# Patient Record
Sex: MALE | Race: WHITE | NOT HISPANIC OR LATINO | Employment: UNEMPLOYED | URBAN - NONMETROPOLITAN AREA
[De-identification: names, ages, dates, MRNs, and addresses within clinical notes are randomized per-mention and may not be internally consistent; named-entity substitution may affect disease eponyms.]

---

## 2017-11-12 ENCOUNTER — HOSPITAL ENCOUNTER (INPATIENT)
Facility: HOSPITAL | Age: 52
LOS: 3 days | Discharge: HOME | End: 2017-11-15
Attending: EMERGENCY MEDICINE | Admitting: INTERNAL MEDICINE

## 2017-11-12 ENCOUNTER — APPOINTMENT (OUTPATIENT)
Dept: GENERAL RADIOLOGY | Facility: HOSPITAL | Age: 52
End: 2017-11-12

## 2017-11-12 DIAGNOSIS — J18.9 PNEUMONIA OF BOTH LUNGS DUE TO INFECTIOUS ORGANISM, UNSPECIFIED PART OF LUNG: Primary | ICD-10-CM

## 2017-11-12 DIAGNOSIS — F10.20 ALCOHOLISM (HCC): ICD-10-CM

## 2017-11-12 LAB
ALBUMIN SERPL-MCNC: 4.9 G/DL (ref 3.5–5)
ALBUMIN/GLOB SERPL: 1.4 G/DL (ref 1–2)
ALP SERPL-CCNC: 69 U/L (ref 38–126)
ALT SERPL W P-5'-P-CCNC: 48 U/L (ref 13–69)
AMPHET+METHAMPHET UR QL: NEGATIVE
AMPHETAMINES UR QL: NEGATIVE
ANION GAP SERPL CALCULATED.3IONS-SCNC: 22.6 MMOL/L
ARTERIAL PATENCY WRIST A: POSITIVE
AST SERPL-CCNC: 53 U/L (ref 15–46)
BARBITURATES UR QL SCN: NEGATIVE
BASE EXCESS BLDA CALC-SCNC: -1.5 MMOL/L
BASOPHILS # BLD AUTO: 0.07 10*3/MM3 (ref 0–0.2)
BASOPHILS NFR BLD AUTO: 1.1 % (ref 0–2.5)
BDY SITE: ABNORMAL
BENZODIAZ UR QL SCN: NEGATIVE
BILIRUB SERPL-MCNC: 1.1 MG/DL (ref 0.2–1.3)
BILIRUB UR QL STRIP: NEGATIVE
BUN BLD-MCNC: 10 MG/DL (ref 7–20)
BUN/CREAT SERPL: 10 (ref 6.3–21.9)
BUPRENORPHINE SERPL-MCNC: NEGATIVE NG/ML
CALCIUM SPEC-SCNC: 8.7 MG/DL (ref 8.4–10.2)
CANNABINOIDS SERPL QL: NEGATIVE
CHLORIDE SERPL-SCNC: 93 MMOL/L (ref 98–107)
CLARITY UR: CLEAR
CO2 SERPL-SCNC: 20 MMOL/L (ref 26–30)
COCAINE UR QL: NEGATIVE
COHGB MFR BLD: 2.1 %
COLOR UR: YELLOW
CREAT BLD-MCNC: 1 MG/DL (ref 0.6–1.3)
D-LACTATE SERPL-SCNC: 1.7 MMOL/L (ref 0.5–2)
DEPRECATED RDW RBC AUTO: 41.6 FL (ref 37–54)
EOSINOPHIL # BLD AUTO: 0.21 10*3/MM3 (ref 0–0.7)
EOSINOPHIL NFR BLD AUTO: 3.3 % (ref 0–7)
ERYTHROCYTE [DISTWIDTH] IN BLOOD BY AUTOMATED COUNT: 12.2 % (ref 11.5–14.5)
ETHANOL BLD-MCNC: 266 MG/DL
ETHANOL UR QL: 0.27 %
FLUAV AG NPH QL: NEGATIVE
FLUBV AG NPH QL IA: NEGATIVE
GFR SERPL CREATININE-BSD FRML MDRD: 78 ML/MIN/1.73
GLOBULIN UR ELPH-MCNC: 3.5 GM/DL
GLUCOSE BLD-MCNC: 112 MG/DL (ref 74–98)
GLUCOSE UR STRIP-MCNC: NEGATIVE MG/DL
HCO3 BLDA-SCNC: 23.2 MMOL/L (ref 22–28)
HCT VFR BLD AUTO: 43.1 % (ref 42–52)
HGB BLD-MCNC: 15.3 G/DL (ref 14–18)
HGB BLDA-MCNC: 16.2 G/DL (ref 12–18)
HGB UR QL STRIP.AUTO: NEGATIVE
HOROWITZ INDEX BLD+IHG-RTO: 36 %
IMM GRANULOCYTES # BLD: 0.02 10*3/MM3 (ref 0–0.06)
IMM GRANULOCYTES NFR BLD: 0.3 % (ref 0–0.6)
INR PPP: 4.23 (ref 0.9–1.1)
KETONES UR QL STRIP: NEGATIVE
LEUKOCYTE ESTERASE UR QL STRIP.AUTO: NEGATIVE
LYMPHOCYTES # BLD AUTO: 1.55 10*3/MM3 (ref 0.6–3.4)
LYMPHOCYTES NFR BLD AUTO: 24.3 % (ref 10–50)
MCH RBC QN AUTO: 32.8 PG (ref 27–31)
MCHC RBC AUTO-ENTMCNC: 35.5 G/DL (ref 30–37)
MCV RBC AUTO: 92.3 FL (ref 80–94)
METHADONE UR QL SCN: NEGATIVE
METHGB BLD QL: 0.9 %
MODALITY: ABNORMAL
MONOCYTES # BLD AUTO: 0.59 10*3/MM3 (ref 0–0.9)
MONOCYTES NFR BLD AUTO: 9.3 % (ref 0–12)
NEUTROPHILS # BLD AUTO: 3.93 10*3/MM3 (ref 2–6.9)
NEUTROPHILS NFR BLD AUTO: 61.7 % (ref 37–80)
NITRITE UR QL STRIP: NEGATIVE
NRBC BLD MANUAL-RTO: 0 /100 WBC (ref 0–0)
NT-PROBNP SERPL-MCNC: 123 PG/ML (ref 0–125)
OPIATES UR QL: NEGATIVE
OXYCODONE UR QL SCN: NEGATIVE
OXYHGB MFR BLDV: 91.1 % (ref 94–99)
PCO2 BLDA: 36.9 MM HG (ref 35–45)
PCP UR QL SCN: NEGATIVE
PH BLDA: 7.41 PH UNITS (ref 7.3–7.5)
PH UR STRIP.AUTO: 6 [PH] (ref 5–8)
PLATELET # BLD AUTO: 158 10*3/MM3 (ref 130–400)
PMV BLD AUTO: 9.7 FL (ref 6–12)
PO2 BLDA: 70.4 MM HG (ref 75–100)
POTASSIUM BLD-SCNC: 4.6 MMOL/L (ref 3.5–5.1)
PROPOXYPH UR QL: NEGATIVE
PROT SERPL-MCNC: 8.4 G/DL (ref 6.3–8.2)
PROT UR QL STRIP: NEGATIVE
PROTHROMBIN TIME: 46.4 SECONDS (ref 9.3–12.1)
RBC # BLD AUTO: 4.67 10*6/MM3 (ref 4.7–6.1)
SAO2 % BLDCOA: 93.9 %
SODIUM BLD-SCNC: 131 MMOL/L (ref 137–145)
SP GR UR STRIP: <=1.005 (ref 1–1.03)
TRICYCLICS UR QL SCN: NEGATIVE
TROPONIN I SERPL-MCNC: 0.03 NG/ML (ref 0–0.03)
UROBILINOGEN UR QL STRIP: NORMAL
WBC NRBC COR # BLD: 6.37 10*3/MM3 (ref 4.8–10.8)

## 2017-11-12 PROCEDURE — 25010000002 LEVOFLOXACIN PER 250 MG: Performed by: EMERGENCY MEDICINE

## 2017-11-12 PROCEDURE — 94799 UNLISTED PULMONARY SVC/PX: CPT

## 2017-11-12 PROCEDURE — 36600 WITHDRAWAL OF ARTERIAL BLOOD: CPT

## 2017-11-12 PROCEDURE — 93005 ELECTROCARDIOGRAM TRACING: CPT | Performed by: EMERGENCY MEDICINE

## 2017-11-12 PROCEDURE — 87040 BLOOD CULTURE FOR BACTERIA: CPT | Performed by: EMERGENCY MEDICINE

## 2017-11-12 PROCEDURE — 87804 INFLUENZA ASSAY W/OPTIC: CPT | Performed by: EMERGENCY MEDICINE

## 2017-11-12 PROCEDURE — 83605 ASSAY OF LACTIC ACID: CPT | Performed by: EMERGENCY MEDICINE

## 2017-11-12 PROCEDURE — 82375 ASSAY CARBOXYHB QUANT: CPT

## 2017-11-12 PROCEDURE — 80306 DRUG TEST PRSMV INSTRMNT: CPT | Performed by: EMERGENCY MEDICINE

## 2017-11-12 PROCEDURE — 25010000002 PIPERACILLIN SOD-TAZOBACTAM PER 1 G: Performed by: EMERGENCY MEDICINE

## 2017-11-12 PROCEDURE — 25010000002 METHYLPREDNISOLONE PER 125 MG: Performed by: EMERGENCY MEDICINE

## 2017-11-12 PROCEDURE — 82805 BLOOD GASES W/O2 SATURATION: CPT

## 2017-11-12 PROCEDURE — 84484 ASSAY OF TROPONIN QUANT: CPT | Performed by: EMERGENCY MEDICINE

## 2017-11-12 PROCEDURE — 25010000002 MORPHINE PER 10 MG: Performed by: INTERNAL MEDICINE

## 2017-11-12 PROCEDURE — 94660 CPAP INITIATION&MGMT: CPT

## 2017-11-12 PROCEDURE — 71010 HC CHEST PA OR AP: CPT

## 2017-11-12 PROCEDURE — 83880 ASSAY OF NATRIURETIC PEPTIDE: CPT | Performed by: EMERGENCY MEDICINE

## 2017-11-12 PROCEDURE — 99223 1ST HOSP IP/OBS HIGH 75: CPT | Performed by: INTERNAL MEDICINE

## 2017-11-12 PROCEDURE — 83050 HGB METHEMOGLOBIN QUAN: CPT

## 2017-11-12 PROCEDURE — 81003 URINALYSIS AUTO W/O SCOPE: CPT | Performed by: EMERGENCY MEDICINE

## 2017-11-12 PROCEDURE — 85610 PROTHROMBIN TIME: CPT | Performed by: EMERGENCY MEDICINE

## 2017-11-12 PROCEDURE — 80307 DRUG TEST PRSMV CHEM ANLYZR: CPT | Performed by: EMERGENCY MEDICINE

## 2017-11-12 PROCEDURE — 80053 COMPREHEN METABOLIC PANEL: CPT | Performed by: EMERGENCY MEDICINE

## 2017-11-12 PROCEDURE — 85025 COMPLETE CBC W/AUTO DIFF WBC: CPT | Performed by: EMERGENCY MEDICINE

## 2017-11-12 PROCEDURE — 99284 EMERGENCY DEPT VISIT MOD MDM: CPT

## 2017-11-12 PROCEDURE — 94640 AIRWAY INHALATION TREATMENT: CPT

## 2017-11-12 RX ORDER — ACETAMINOPHEN 325 MG/1
650 TABLET ORAL EVERY 4 HOURS PRN
Status: DISCONTINUED | OUTPATIENT
Start: 2017-11-12 | End: 2017-11-15 | Stop reason: HOSPADM

## 2017-11-12 RX ORDER — IPRATROPIUM BROMIDE AND ALBUTEROL SULFATE 2.5; .5 MG/3ML; MG/3ML
3 SOLUTION RESPIRATORY (INHALATION) EVERY 4 HOURS PRN
Status: DISCONTINUED | OUTPATIENT
Start: 2017-11-12 | End: 2017-11-15 | Stop reason: HOSPADM

## 2017-11-12 RX ORDER — DILTIAZEM HYDROCHLORIDE 180 MG/1
360 CAPSULE, COATED, EXTENDED RELEASE ORAL
Status: DISCONTINUED | OUTPATIENT
Start: 2017-11-13 | End: 2017-11-15 | Stop reason: HOSPADM

## 2017-11-12 RX ORDER — SULFAMETHOXAZOLE AND TRIMETHOPRIM 800; 160 MG/1; MG/1
1 TABLET ORAL DAILY
Status: DISCONTINUED | OUTPATIENT
Start: 2017-11-13 | End: 2017-11-15 | Stop reason: HOSPADM

## 2017-11-12 RX ORDER — LORAZEPAM 2 MG/ML
1 INJECTION INTRAMUSCULAR
Status: DISCONTINUED | OUTPATIENT
Start: 2017-11-12 | End: 2017-11-15 | Stop reason: HOSPADM

## 2017-11-12 RX ORDER — LORAZEPAM 2 MG/ML
2 INJECTION INTRAMUSCULAR
Status: DISCONTINUED | OUTPATIENT
Start: 2017-11-12 | End: 2017-11-15 | Stop reason: HOSPADM

## 2017-11-12 RX ORDER — LORAZEPAM 2 MG/ML
4 INJECTION INTRAMUSCULAR
Status: DISCONTINUED | OUTPATIENT
Start: 2017-11-12 | End: 2017-11-15 | Stop reason: HOSPADM

## 2017-11-12 RX ORDER — EMTRICITABINE AND TENOFOVIR DISOPROXIL FUMARATE 200; 300 MG/1; MG/1
1 TABLET, FILM COATED ORAL DAILY
Status: DISCONTINUED | OUTPATIENT
Start: 2017-11-13 | End: 2017-11-15 | Stop reason: HOSPADM

## 2017-11-12 RX ORDER — METHYLPREDNISOLONE SODIUM SUCCINATE 125 MG/2ML
125 INJECTION, POWDER, LYOPHILIZED, FOR SOLUTION INTRAMUSCULAR; INTRAVENOUS ONCE
Status: COMPLETED | OUTPATIENT
Start: 2017-11-12 | End: 2017-11-12

## 2017-11-12 RX ORDER — SODIUM CHLORIDE 0.9 % (FLUSH) 0.9 %
1-10 SYRINGE (ML) INJECTION AS NEEDED
Status: DISCONTINUED | OUTPATIENT
Start: 2017-11-12 | End: 2017-11-15 | Stop reason: HOSPADM

## 2017-11-12 RX ORDER — PANTOPRAZOLE SODIUM 40 MG/1
40 TABLET, DELAYED RELEASE ORAL DAILY
Status: DISCONTINUED | OUTPATIENT
Start: 2017-11-13 | End: 2017-11-15 | Stop reason: HOSPADM

## 2017-11-12 RX ORDER — BUDESONIDE 0.5 MG/2ML
0.5 INHALANT ORAL
Status: DISCONTINUED | OUTPATIENT
Start: 2017-11-12 | End: 2017-11-15 | Stop reason: HOSPADM

## 2017-11-12 RX ORDER — LORAZEPAM 2 MG/1
4 TABLET ORAL
Status: DISCONTINUED | OUTPATIENT
Start: 2017-11-12 | End: 2017-11-15 | Stop reason: HOSPADM

## 2017-11-12 RX ORDER — METHYLPREDNISOLONE SODIUM SUCCINATE 125 MG/2ML
80 INJECTION, POWDER, LYOPHILIZED, FOR SOLUTION INTRAMUSCULAR; INTRAVENOUS EVERY 8 HOURS
Status: DISCONTINUED | OUTPATIENT
Start: 2017-11-13 | End: 2017-11-14

## 2017-11-12 RX ORDER — HYDROCODONE BITARTRATE AND ACETAMINOPHEN 7.5; 325 MG/1; MG/1
1 TABLET ORAL EVERY 4 HOURS PRN
Status: DISCONTINUED | OUTPATIENT
Start: 2017-11-12 | End: 2017-11-15 | Stop reason: HOSPADM

## 2017-11-12 RX ORDER — IPRATROPIUM BROMIDE AND ALBUTEROL SULFATE 2.5; .5 MG/3ML; MG/3ML
3 SOLUTION RESPIRATORY (INHALATION) ONCE
Status: COMPLETED | OUTPATIENT
Start: 2017-11-12 | End: 2017-11-12

## 2017-11-12 RX ORDER — CARVEDILOL 25 MG/1
25 TABLET ORAL 2 TIMES DAILY WITH MEALS
Status: DISCONTINUED | OUTPATIENT
Start: 2017-11-12 | End: 2017-11-15 | Stop reason: HOSPADM

## 2017-11-12 RX ORDER — LORAZEPAM 0.5 MG/1
2 TABLET ORAL
Status: DISCONTINUED | OUTPATIENT
Start: 2017-11-12 | End: 2017-11-15 | Stop reason: HOSPADM

## 2017-11-12 RX ORDER — LORAZEPAM 0.5 MG/1
1 TABLET ORAL
Status: DISCONTINUED | OUTPATIENT
Start: 2017-11-12 | End: 2017-11-15 | Stop reason: HOSPADM

## 2017-11-12 RX ORDER — MORPHINE SULFATE 2 MG/ML
INJECTION, SOLUTION INTRAMUSCULAR; INTRAVENOUS
Status: DISPENSED
Start: 2017-11-12 | End: 2017-11-13

## 2017-11-12 RX ORDER — MORPHINE SULFATE 2 MG/ML
1 INJECTION, SOLUTION INTRAMUSCULAR; INTRAVENOUS ONCE
Status: COMPLETED | OUTPATIENT
Start: 2017-11-12 | End: 2017-11-12

## 2017-11-12 RX ORDER — MORPHINE SULFATE 2 MG/ML
1 INJECTION, SOLUTION INTRAMUSCULAR; INTRAVENOUS
Status: DISCONTINUED | OUTPATIENT
Start: 2017-11-12 | End: 2017-11-15 | Stop reason: HOSPADM

## 2017-11-12 RX ORDER — LEVOFLOXACIN 5 MG/ML
750 INJECTION, SOLUTION INTRAVENOUS ONCE
Status: COMPLETED | OUTPATIENT
Start: 2017-11-12 | End: 2017-11-12

## 2017-11-12 RX ORDER — SODIUM CHLORIDE 9 MG/ML
100 INJECTION, SOLUTION INTRAVENOUS CONTINUOUS
Status: DISCONTINUED | OUTPATIENT
Start: 2017-11-12 | End: 2017-11-15 | Stop reason: HOSPADM

## 2017-11-12 RX ORDER — SODIUM CHLORIDE 0.9 % (FLUSH) 0.9 %
10 SYRINGE (ML) INJECTION AS NEEDED
Status: DISCONTINUED | OUTPATIENT
Start: 2017-11-12 | End: 2017-11-15 | Stop reason: HOSPADM

## 2017-11-12 RX ORDER — EMTRICITABINE AND TENOFOVIR DISOPROXIL FUMARATE 200; 300 MG/1; MG/1
1 TABLET, FILM COATED ORAL
Status: DISCONTINUED | OUTPATIENT
Start: 2017-11-13 | End: 2017-11-12

## 2017-11-12 RX ADMIN — BUDESONIDE 0.5 MG: 0.5 INHALANT RESPIRATORY (INHALATION) at 22:52

## 2017-11-12 RX ADMIN — TAZOBACTAM SODIUM AND PIPERACILLIN SODIUM 4.5 G: 500; 4 INJECTION, SOLUTION INTRAVENOUS at 19:31

## 2017-11-12 RX ADMIN — LEVOFLOXACIN 750 MG: 5 INJECTION, SOLUTION INTRAVENOUS at 21:29

## 2017-11-12 RX ADMIN — MORPHINE SULFATE 1 MG: 2 INJECTION, SOLUTION INTRAMUSCULAR; INTRAVENOUS at 20:52

## 2017-11-12 RX ADMIN — RACEPINEPHRINE HYDROCHLORIDE 0.5 ML: 11.25 SOLUTION RESPIRATORY (INHALATION) at 18:22

## 2017-11-12 RX ADMIN — IPRATROPIUM BROMIDE AND ALBUTEROL SULFATE 3 ML: .5; 3 SOLUTION RESPIRATORY (INHALATION) at 22:52

## 2017-11-12 RX ADMIN — SODIUM CHLORIDE 100 ML/HR: 9 INJECTION, SOLUTION INTRAVENOUS at 21:36

## 2017-11-12 RX ADMIN — METHYLPREDNISOLONE SODIUM SUCCINATE 125 MG: 125 INJECTION, POWDER, FOR SOLUTION INTRAMUSCULAR; INTRAVENOUS at 18:34

## 2017-11-12 RX ADMIN — CARVEDILOL 25 MG: 25 TABLET, FILM COATED ORAL at 21:38

## 2017-11-12 RX ADMIN — IPRATROPIUM BROMIDE AND ALBUTEROL SULFATE 3 ML: .5; 3 SOLUTION RESPIRATORY (INHALATION) at 18:23

## 2017-11-12 NOTE — ED PROVIDER NOTES
Subjective   Patient is a 52 y.o. male presenting with shortness of breath.   History provided by:  Patient   used: No    Shortness of Breath   Severity:  Moderate  Onset quality:  Sudden  Timing:  Constant  Progression:  Unchanged  Chronicity:  New  Context: not URI and not weather changes    Relieved by:  Nothing  Worsened by:  Nothing  Ineffective treatments:  None tried  Associated symptoms: wheezing    Associated symptoms: no abdominal pain, no chest pain, no cough, no diaphoresis, no fever, no headaches, no neck pain, no rash, no sore throat, no sputum production, no syncope and no vomiting        Review of Systems   Constitutional: Negative for chills, diaphoresis and fever.   HENT: Negative for congestion, rhinorrhea, sore throat and trouble swallowing.    Eyes: Negative for discharge and visual disturbance.   Respiratory: Positive for shortness of breath and wheezing. Negative for cough, sputum production and chest tightness.    Cardiovascular: Negative for chest pain, palpitations, leg swelling and syncope.   Gastrointestinal: Negative for abdominal pain, constipation, diarrhea, nausea and vomiting.   Genitourinary: Negative for dysuria, flank pain and hematuria.   Musculoskeletal: Negative for back pain, myalgias and neck pain.   Skin: Negative for color change and rash.   Neurological: Negative for dizziness, weakness, numbness and headaches.   Psychiatric/Behavioral: Negative for self-injury and suicidal ideas.       Past Medical History:   Diagnosis Date   • Alcohol abuse    • Alcoholism    • Anxiety    • Cancer    • Chronic pain disorder    • HIV disease    • Liver disease    • Withdrawal symptoms, alcohol        Allergies   Allergen Reactions   • Hydralazine Other (See Comments) and Cough     SORE THROAT       History reviewed. No pertinent surgical history.    Family History   Problem Relation Age of Onset   • Dementia Mother    • Alcohol abuse Father    • Dementia Father    •  Alcohol abuse Maternal Grandfather    • Alcohol abuse Maternal Grandmother        Social History     Social History   • Marital status: Single     Spouse name: N/A   • Number of children: N/A   • Years of education: N/A     Social History Main Topics   • Smoking status: Never Smoker   • Smokeless tobacco: None   • Alcohol use 7.2 oz/week     12 Cans of beer per week      Comment: 16 OZ BEERS PER DAY/ 12 PACK   • Drug use: No   • Sexual activity: No     Other Topics Concern   • None     Social History Narrative           Objective   Physical Exam   Constitutional: He is oriented to person, place, and time.   Intoxicated   HENT:   Head: Normocephalic and atraumatic.   Nose: Nose normal.   Mouth/Throat: Oropharynx is clear and moist.   Eyes: Conjunctivae and EOM are normal. Pupils are equal, round, and reactive to light.   Neck: Normal range of motion. Neck supple.   Cardiovascular: Normal rate, regular rhythm, normal heart sounds and intact distal pulses.    Pulmonary/Chest: Effort normal. Stridor present. No respiratory distress. He has wheezes. He exhibits no tenderness.   Abdominal: Soft. Bowel sounds are normal. He exhibits no distension. There is no tenderness. There is no rebound and no guarding.   Musculoskeletal: Normal range of motion. He exhibits no edema, tenderness or deformity.   Neurological: He is alert and oriented to person, place, and time. No cranial nerve deficit. Coordination normal.   Skin: Skin is warm and dry. No rash noted. No erythema. No pallor.   Psychiatric: He has a normal mood and affect. His behavior is normal. Judgment and thought content normal.   Nursing note and vitals reviewed.      Procedures         ED Course  ED Course                  MDM  Number of Diagnoses or Management Options  Diagnosis management comments: 52-year-old male presents emergency Department intoxicated with complaints of shortness of breath.  Patient's nephew found the patient is laying on the ground  unresponsive and blue in the face.  Nephew noticed that he wasn't breathing.  He woke him up and has been monitoring the patient over the last 3 hours.  The patient is not doing any better.  He still having wheezing and stridor.  Patient has a history of HIV.  Follows up in [place] with ID. CD4 count 198 per patient. In the emergency department, patient's oxygen saturation on arrival was 88% on room air.  Patient has wheezing in his upper lung fields.  Patient has stridor.  Labs and imaging ordered.  A DuoNeb treatment, Solu-Medrol, racemic epi were ordered.    EKG: Ventricular rate 61, FL interval 218, , castration 86, sinus rhythm.  First-degree AV block.      Final diagnoses:   Pneumonia of both lungs due to infectious organism, unspecified part of lung   Alcoholism            Ryan Gamboa MD  11/14/17 5665

## 2017-11-13 PROBLEM — K70.10 ALCOHOLIC HEPATITIS WITHOUT ASCITES: Status: ACTIVE | Noted: 2017-11-13

## 2017-11-13 PROBLEM — J96.01 ACUTE RESPIRATORY FAILURE WITH HYPOXIA (HCC): Status: ACTIVE | Noted: 2017-11-13

## 2017-11-13 PROBLEM — F10.920 ACUTE ALCOHOLIC INTOXICATION WITHOUT COMPLICATION (HCC): Status: ACTIVE | Noted: 2017-11-13

## 2017-11-13 LAB
INR PPP: 5.37 (ref 0.9–1.1)
PROTHROMBIN TIME: 59.8 SECONDS (ref 9.3–12.1)

## 2017-11-13 PROCEDURE — 85610 PROTHROMBIN TIME: CPT | Performed by: INTERNAL MEDICINE

## 2017-11-13 PROCEDURE — 99233 SBSQ HOSP IP/OBS HIGH 50: CPT | Performed by: INTERNAL MEDICINE

## 2017-11-13 PROCEDURE — 25010000002 METHYLPREDNISOLONE PER 125 MG: Performed by: INTERNAL MEDICINE

## 2017-11-13 PROCEDURE — 25010000002 PIPERACILLIN SOD-TAZOBACTAM PER 1 G: Performed by: INTERNAL MEDICINE

## 2017-11-13 PROCEDURE — 94799 UNLISTED PULMONARY SVC/PX: CPT

## 2017-11-13 PROCEDURE — 94660 CPAP INITIATION&MGMT: CPT

## 2017-11-13 RX ORDER — MULTIPLE VITAMINS W/ MINERALS TAB 9MG-400MCG
1 TAB ORAL DAILY
Status: DISCONTINUED | OUTPATIENT
Start: 2017-11-14 | End: 2017-11-13

## 2017-11-13 RX ORDER — MULTIPLE VITAMINS W/ MINERALS TAB 9MG-400MCG
1 TAB ORAL DAILY
Status: COMPLETED | OUTPATIENT
Start: 2017-11-13 | End: 2017-11-15

## 2017-11-13 RX ORDER — FOLIC ACID 1 MG/1
1 TABLET ORAL DAILY
Status: DISCONTINUED | OUTPATIENT
Start: 2017-11-14 | End: 2017-11-13

## 2017-11-13 RX ORDER — THIAMINE MONONITRATE (VIT B1) 100 MG
100 TABLET ORAL DAILY
Status: DISCONTINUED | OUTPATIENT
Start: 2017-11-14 | End: 2017-11-13

## 2017-11-13 RX ORDER — GABAPENTIN 600 MG/1
600 TABLET ORAL 3 TIMES DAILY
Status: ON HOLD | COMMUNITY
End: 2020-11-17

## 2017-11-13 RX ORDER — FOLIC ACID 1 MG/1
1 TABLET ORAL DAILY
Status: COMPLETED | OUTPATIENT
Start: 2017-11-13 | End: 2017-11-15

## 2017-11-13 RX ORDER — LEVOFLOXACIN 5 MG/ML
500 INJECTION, SOLUTION INTRAVENOUS EVERY 24 HOURS
Status: DISCONTINUED | OUTPATIENT
Start: 2017-11-13 | End: 2017-11-15 | Stop reason: HOSPADM

## 2017-11-13 RX ORDER — THIAMINE MONONITRATE (VIT B1) 100 MG
100 TABLET ORAL DAILY
Status: DISCONTINUED | OUTPATIENT
Start: 2017-11-13 | End: 2017-11-14 | Stop reason: SDUPTHER

## 2017-11-13 RX ADMIN — DILTIAZEM HYDROCHLORIDE 360 MG: 180 CAPSULE, COATED, EXTENDED RELEASE ORAL at 09:39

## 2017-11-13 RX ADMIN — METHYLPREDNISOLONE SODIUM SUCCINATE 80 MG: 125 INJECTION, POWDER, FOR SOLUTION INTRAMUSCULAR; INTRAVENOUS at 11:49

## 2017-11-13 RX ADMIN — TAZOBACTAM SODIUM AND PIPERACILLIN SODIUM 4.5 G: 500; 4 INJECTION, SOLUTION INTRAVENOUS at 20:14

## 2017-11-13 RX ADMIN — FOLIC ACID 1 MG: 1 TABLET ORAL at 12:45

## 2017-11-13 RX ADMIN — CARVEDILOL 25 MG: 25 TABLET, FILM COATED ORAL at 09:38

## 2017-11-13 RX ADMIN — METHYLPREDNISOLONE SODIUM SUCCINATE 80 MG: 125 INJECTION, POWDER, FOR SOLUTION INTRAMUSCULAR; INTRAVENOUS at 03:20

## 2017-11-13 RX ADMIN — TAZOBACTAM SODIUM AND PIPERACILLIN SODIUM 4.5 G: 500; 4 INJECTION, SOLUTION INTRAVENOUS at 04:43

## 2017-11-13 RX ADMIN — CARVEDILOL 25 MG: 25 TABLET, FILM COATED ORAL at 18:16

## 2017-11-13 RX ADMIN — PANTOPRAZOLE SODIUM 40 MG: 40 TABLET, DELAYED RELEASE ORAL at 09:38

## 2017-11-13 RX ADMIN — TAZOBACTAM SODIUM AND PIPERACILLIN SODIUM 4.5 G: 500; 4 INJECTION, SOLUTION INTRAVENOUS at 11:54

## 2017-11-13 RX ADMIN — BUDESONIDE 0.5 MG: 0.5 INHALANT RESPIRATORY (INHALATION) at 19:48

## 2017-11-13 RX ADMIN — Medication 100 MG: at 12:45

## 2017-11-13 RX ADMIN — METHYLPREDNISOLONE SODIUM SUCCINATE 80 MG: 125 INJECTION, POWDER, FOR SOLUTION INTRAMUSCULAR; INTRAVENOUS at 18:16

## 2017-11-13 RX ADMIN — IPRATROPIUM BROMIDE AND ALBUTEROL SULFATE 3 ML: .5; 3 SOLUTION RESPIRATORY (INHALATION) at 06:51

## 2017-11-13 RX ADMIN — SULFAMETHOXAZOLE AND TRIMETHOPRIM 160 MG: 800; 160 TABLET ORAL at 09:39

## 2017-11-13 RX ADMIN — BUDESONIDE 0.5 MG: 0.5 INHALANT RESPIRATORY (INHALATION) at 06:51

## 2017-11-13 RX ADMIN — EMTRICITABINE AND TENOFOVIR DISOPROXIL FUMARATE 1 TABLET: 200; 300 TABLET, FILM COATED ORAL at 09:38

## 2017-11-13 RX ADMIN — MULTIPLE VITAMINS W/ MINERALS TAB 1 TABLET: TAB at 12:45

## 2017-11-13 RX ADMIN — IPRATROPIUM BROMIDE AND ALBUTEROL SULFATE 3 ML: .5; 3 SOLUTION RESPIRATORY (INHALATION) at 19:48

## 2017-11-13 NOTE — PROGRESS NOTES
"Hospitalist Progress Note.    LOS: 1 day    Patient Care Team:  Rosa Tucker MD as PCP - General    Chief Complaint:    Chief Complaint   Patient presents with   • Shortness of Breath       Subjective   Mr. Parker is a 52-year-old male with medical history of HIV/AIDS on HAART therapy with reported compliance per patient, last CD4 count 198, admitted to the ICU with severe COPD exacerbation, placed on BiPAP and improving significantly.     Patient seen and examined this morning.  He appears extremely anxious rocking back and forth in his chair.  States that he is feeling much better.  Denies any coughing, fevers, or chills.  He is requesting that we move him out of the ICU as the ICU makes him extremely anxious.  In terms of his alcohol use, he does admit to drinking significant amount of alcohol and states that he is aware that he needs to cut down on his alcohol intake.  He however is not interested in going to rehabilitation at this time.  Denies ever going into alcohol withdrawal. Patient denies any history of smoking.  Does have nebulizers at home which she has been out of for the past 2 weeks.    No nausea or vomiting no abdominal pain.  There is no significant edema.   Patient also denies having new onset weakness of numbness of either extremity.    Review of Systems:    The pertinent  ROS was done and it is noted above, rest  was negative.    Objective     Vital Signs  /98  Pulse 72  Temp 97.6 °F (36.4 °C) (Axillary)   Resp 19  Ht 70\" (177.8 cm)  Wt 241 lb 14.4 oz (110 kg)  SpO2 96%  BMI 34.71 kg/m2           Intake/Output Summary (Last 24 hours) at 11/13/17 1224  Last data filed at 11/13/17 0700   Gross per 24 hour   Intake             1316 ml   Output             5325 ml   Net            -4009 ml       Physical Exam:    General Appearance: alert, oriented x 3, no acute distress, appears mildly anxious  HEENT: pupils round and reactive to light, oral mucosa dry, extra occular movements " intact.  Neck: supple, no JVD, trachea midline  Lungs: Clear to Auscultation, unlabored breathing effort, mild diffuse expiratory wheezing with scattered rhonchi  Heart: RRR, normal S1 and S2, no S3, no rub  Abdomen: soft, non-tender, no palpable bladder, present bowel sounds to auscultation  Extremities: no edema, cyanosis or clubbing.   Neuro: normal speech and mental status, grossly non focal.     Results Review:      Results from last 7 days  Lab Units 11/12/17  1820   SODIUM mmol/L 131*   POTASSIUM mmol/L 4.6   CHLORIDE mmol/L 93*   CO2 mmol/L 20.0*   BUN mg/dL 10   CREATININE mg/dL 1.00   CALCIUM mg/dL 8.7   BILIRUBIN mg/dL 1.1   ALK PHOS U/L 69   ALT (SGPT) U/L 48   AST (SGOT) U/L 53*   GLUCOSE mg/dL 112*       Estimated Creatinine Clearance: 106.1 mL/min (by C-G formula based on Cr of 1).                  Results from last 7 days  Lab Units 11/12/17  1820   WBC 10*3/mm3 6.37   HEMOGLOBIN g/dL 15.3   PLATELETS 10*3/mm3 158         Results from last 7 days  Lab Units 11/13/17  0433 11/12/17  1845   INR  5.37* 4.23*         Imaging Results (last 24 hours)     Procedure Component Value Units Date/Time    XR Chest 1 View [[id number]] Collected:  11/13/17 0740     Updated:  11/13/17 0746    Narrative:       PROCEDURE: XR CHEST 1 VW-     HISTORY: shortness of breath     COMPARISON: September 4, 2016.     FINDINGS: Electrodes overlie the chest.The heart is normal in size. The  mediastinum is unremarkable. The lungs are clear. There is no  pneumothorax.  There are no acute osseous abnormalities. Chronic changes  in the left clavicle.       Impression:       No acute cardiopulmonary process.     Continued followup is recommended.     This report was finalized on 11/13/2017 7:44 AM by Tejas Kolb DO.          budesonide 0.5 mg Nebulization BID - RT   carvedilol 25 mg Oral BID With Meals   diltiaZEM  mg Oral Q24H   emtricitabine-tenofovir 1 tablet Oral Daily   folic acid 1 mg Oral Daily   And      vitamin B-1 100  mg Oral Daily   And      multivitamin with minerals 1 tablet Oral Daily   methylPREDNISolone sodium succinate 80 mg Intravenous Q8H   pantoprazole 40 mg Oral Daily   piperacillin-tazobactam 4.5 g Intravenous Q8H   sulfamethoxazole-trimethoprim 1 tablet Oral Daily       Pharmacy to dose warfarin     sodium chloride 100 mL/hr Last Rate: 100 mL/hr (11/12/17 2136)       Medication Review:   Current Facility-Administered Medications   Medication Dose Route Frequency Provider Last Rate Last Dose   • acetaminophen (TYLENOL) tablet 650 mg  650 mg Oral Q4H PRN Jillian Cruz MD       • budesonide (PULMICORT) nebulizer solution 0.5 mg  0.5 mg Nebulization BID - RT Jillian Cruz MD   0.5 mg at 11/13/17 0651   • carvedilol (COREG) tablet 25 mg  25 mg Oral BID With Meals Jillian Cruz MD   25 mg at 11/13/17 0938   • diltiaZEM CD (CARDIZEM CD) 24 hr capsule 360 mg  360 mg Oral Q24H Jillian Cruz MD   360 mg at 11/13/17 0939   • emtricitabine-tenofovir (TRUVADA) 200-300 MG per tablet 1 tablet  1 tablet Oral Daily Jillian Cruz MD   1 tablet at 11/13/17 0938   • folic acid (FOLVITE) tablet 1 mg  1 mg Oral Daily Jillian Cruz MD        And   • thiamine (VITAMIN B-1) tablet 100 mg  100 mg Oral Daily Jillian Cruz MD        And   • multivitamin with minerals 1 tablet  1 tablet Oral Daily Jillian Cruz MD       • HYDROcodone-acetaminophen (NORCO) 7.5-325 MG per tablet 1 tablet  1 tablet Oral Q4H PRN Jillian Cruz MD       • ipratropium-albuterol (DUO-NEB) nebulizer solution 3 mL  3 mL Nebulization Q4H PRN Jillian Cruz MD   3 mL at 11/13/17 0651   • LORazepam (ATIVAN) tablet 1 mg  1 mg Oral Q2H PRN Jillian Cruz MD        Or   • LORazepam (ATIVAN) injection 1 mg  1 mg Intravenous Q2H PRN Jillian Cruz MD        Or   • LORazepam (ATIVAN) tablet 2 mg  2 mg Oral Q1H PRN Jillian Cruz MD        Or   • LORazepam (ATIVAN) injection 2 mg  2 mg Intravenous Q1H PRN Jillian Cruz MD        Or   •  LORazepam (ATIVAN) injection 2 mg  2 mg Intravenous Q15 Min PRN Jillian Cruz MD        Or   • LORazepam (ATIVAN) injection 2 mg  2 mg Intramuscular Q15 Min PRN Jillian Cruz MD        Or   • LORazepam (ATIVAN) tablet 4 mg  4 mg Oral Q1H PRN Jillian Cruz MD        Or   • LORazepam (ATIVAN) injection 4 mg  4 mg Intravenous Q1H PRN Jillian Cruz MD       • methylPREDNISolone sodium succinate (SOLU-Medrol) injection 80 mg  80 mg Intravenous Q8H Jillian Cruz MD   80 mg at 11/13/17 1149   • morphine injection 1 mg  1 mg Intravenous Q3H PRN Jillian Cruz MD       • pantoprazole (PROTONIX) EC tablet 40 mg  40 mg Oral Daily Jillian Cruz MD   40 mg at 11/13/17 0938   • Pharmacy to dose warfarin   Does not apply Continuous PRN Jillian Cruz MD       • piperacillin-tazobactam (ZOSYN) 4.5 g in iso-osmotic dextrose 100 mL IVPB (premix)  4.5 g Intravenous Q8H Jillian Cruz MD   4.5 g at 11/13/17 1154   • sodium chloride 0.9 % flush 1-10 mL  1-10 mL Intravenous PRN Jillian Cruz MD       • sodium chloride 0.9 % flush 10 mL  10 mL Intravenous PRN Ryan Gamboa MD       • sodium chloride 0.9 % infusion  100 mL/hr Intravenous Continuous Jillian Cruz  mL/hr at 11/12/17 2136 100 mL/hr at 11/12/17 2136   • sulfamethoxazole-trimethoprim (BACTRIM DS,SEPTRA DS) 800-160 MG per tablet 160 mg  1 tablet Oral Daily Jillian Cruz MD   160 mg at 11/13/17 0939       Assessment/Plan   1. Community-acquired bilateral lower lobe pneumonia in immunocompromised patient, organism unidentified, present on admission - no acute pathology noted by radiologist on cxray however on my read he does have chronic interstitial changes and possibly an opacity at the right lower lobe and streaky opacity at the left lower lobe as well.  Continue with IV antibiotics including Zosyn and Levaquin.  Blood cultures pending.  We'll obtain sputum cultures.      2. Acute hypoxic respiratory failure, likely  multifactorial secondary to pneumonia and alcohol intoxication, present on admission - he is currently saturating at 95% on 5 L nasal cannula.  We'll try and titrate oxygen down and off.     3. Acute alcohol intoxication with alcohol level 266 on arrival - Continue with CIWA protocol.  Does not appear to be in acute withdrawal at this time.    4. Status post an episode of unresponsiveness, hypopnea and suspected aspiration likely secondary to alcohol intoxication - currently alert and oriented and able to answer all my questions appropriately.  Urine toxicology negative.    5. Chronic hepatitis secondary to alcohol with AST/ALT 53/48    6. HIV/AIDS infection with CD4 for count 198 per patient - reports compliance with his medications including Truvada and Bactrim for PCP prophylaxis.     7. History of recurrent DVTs  requiring chronic anticoagulation, INR is supratherapeutic. Will hold coumadin and monitor INR and restart when appropriate.     8. Suspected sleep apnea hypopnea syndrome based on clinical assessment.  Will benefit from sleep study as outpatient.     Active Problems:    Pneumonia of both lungs due to infectious organism    Acute alcoholic intoxication without complication    Acute respiratory failure with hypoxia    Alcoholic hepatitis without ascites    Details were discussed with the patient.   I also discussed the details with the nursing staff.  Rest as ordered.    Clarisa Mancilla MD  11/13/17  12:24 PM    Please note that portions of this note may have been completed with a voice recognition program. Efforts were made to edit the dictations, but occasionally words are mistranscribed.

## 2017-11-13 NOTE — PROGRESS NOTES
Continued Stay Note  LANA Tsang     Patient Name: Kitty Parker  MRN: [id number]  Today's Date: 11/13/2017    Admit Date: 11/12/2017          Discharge Plan       11/13/17 1314    Case Management/Social Work Plan    Plan SATISH saw patient for discharge planning. Patient lives in [facility] on his families property. Has two kids and his mother. No home health , no previous O2. No DME. Lives in a trailer on family property.  Trying to get his disablility.  No issues getting meds.  mails his medication.  No drug/alchol issues. Aware AA is in [facility] [place].    Additional Comments home with family    Final Note    Final Note following for social and discharge planning.               Discharge Codes     None        Expected Discharge Date and Time     Expected Discharge Date Expected Discharge Time    Nov 16, 2017             Hallie Jacobson

## 2017-11-13 NOTE — PLAN OF CARE
Problem: Patient Care Overview (Adult)  Goal: Plan of Care Review  Outcome: Ongoing (interventions implemented as appropriate)    11/13/17 0532   Coping/Psychosocial Response Interventions   Plan Of Care Reviewed With patient   Patient Care Overview   Progress no change         11/13/17 0532   Coping/Psychosocial Response Interventions   Plan Of Care Reviewed With patient   Patient Care Overview   Progress improving         11/13/17 0532   Coping/Psychosocial Response Interventions   Plan Of Care Reviewed With patient   Patient Care Overview   Progress improving   Outcome Evaluation   Outcome Summary/Follow up Plan improved breath sounds, no stridor at this time. Pt able to take breaks from bipap on nonrebreather         Problem: Pneumonia (Adult)  Goal: Signs and Symptoms of Listed Potential Problems Will be Absent or Manageable (Pneumonia)  Outcome: Ongoing (interventions implemented as appropriate)

## 2017-11-13 NOTE — NURSING NOTE
At 1650 BP was 167/141 according to the tele monitor. Rachael england PCT came and got me (Brandon Siu RN)  BP was checked manually 154/97. Pt stable with no s/s of acute distress.

## 2017-11-13 NOTE — DISCHARGE INSTR - OTHER ORDERS
If you have any questions about your recovery, please call the [facility] Nurse Call Center at [phone]. A registered nurse is available 24 hours a day 7 days a week to assist you.

## 2017-11-13 NOTE — ED NOTES
Patient with labored respirations, and grunting. SpO2 88-89% on 4 liters nasal cannula.  Dr. Gamboa notified.  No new orders received at this time.       Toyin Dwyer RN  11/12/17 8807

## 2017-11-13 NOTE — H&P
[facility]   HISTORY AND PHYSICAL      Name:  Kitty Krishnan   Age:  52 y.o.  Sex:  male  :  1965  MRN:  [id number]   Visit Number:  [id number]  Admission Date:  2017  Date Of Service:  17  Primary Care Physician:  Rosa Tucker MD    History Obtained From:    patient, nursing staff and medical record    Chief Complaint:     Pneumonia, hypoxemia    History Of Presenting Illness:      Mr. Krishnan is a 52 years old gentleman with known history of HIV infection with CD4 count 198 and chronic alcohol dependency who presented to the emergency room via EMS due to altered mental status.    On initial assessment, I was unable to obtain information from patient due to his clinical condition, therefore the following information was obtained from medical record.  Apparently patient was found by his nephew laying on the ground, cyanotic, unresponsive and [not breathing].  He woke him up and observed him for the next few hours with no improvement.  Meanwhile he became short of breath with wheezing and stridor which prompted the transfer to the emergency room for further evaluation and management.    Upon arrival to the emergency room, initial vital signs included temperature 97.6°F, heart rate 68/m, respiratory rate 19/m, blood pressure 133/90, O2 sats 96% on 2 L nasal cannula.  Patient was lethargic but increasingly tachypneic.  His workup was significant for  In the 260s alcohol level in the 260s; INR was supratherapeutic at 4.23, hyponatremia at 131, ABG revealed pH 7.40, PO2 70 and PCO2 36 on 3 L nasal cannula.  Chest x-ray reported bilateral lower lobe pneumonia; EKG was negative for acute findings.    During his stay in the emergency room, patient received IV Solu-Medrol, racemic epinephrine, IV Zosyn and levofloxacin after obtaining blood cultures.  Despite the same, patient continued to be in respiratory distress with diffuse wheezing, therefore was admitted to  intensive care unit for further evaluation and management next    Upon arrival to ICU patient was on 100% nonrebreather; he was tachypneic and in respiratory distress with diffuse expiratory wheezing.  Nebulizers were administered with minimal improvement.  Patient became increasingly anxious as he was using accessory respiratory muscles with signs of distress.  Impending hypoxic respiratory failure prompted consideration for intubation and mechanical ventilation.  However a trial of BiPAP was started along with 1 mg morphine.  Patient gradually improved and eventually was able to rest with no signs of distress.  On repeat a.m. rounds, patient was awake and comfortable.  He was transitioned to Ventimask on which she maintain sats in the 90s.  On exam, occasional expiratory wheezing was noted.    Review Of Systems:  (information obtained is limited due to patient's condition)     General ROS: positive for  - fatigue  Psychological ROS: positive for - anxiety  Ophthalmic ROS: negative  ENT ROS: negative  Allergy and Immunology ROS: negative  Hematological and Lymphatic ROS: negative  Endocrine ROS: negative  Breast ROS: negative for breast lumps  Respiratory ROS: positive for - cough, shortness of breath, sputum changes, tachypnea and wheezing  Cardiovascular ROS: no chest pain or dyspnea on exertion  Gastrointestinal ROS: no abdominal pain, change in bowel habits, or black or bloody stools  Genito-Urinary ROS: no dysuria, trouble voiding, or hematuria  Musculoskeletal ROS: negative  Neurological ROS: no TIA or stroke symptoms  Dermatological ROS: negative     Past Medical History:    Past Medical History:   Diagnosis Date   • Alcohol abuse    • Alcoholism    • Anxiety    • Cancer    • Chronic pain disorder    • HIV disease    • Liver disease    • Withdrawal symptoms, alcohol        Past Surgical history:    History reviewed. No pertinent surgical history.    Social History:    Unable to obtain from patient due to  severe respiratory distress    Family History:    Family History   Problem Relation Age of Onset   • Dementia Mother    • Alcohol abuse Father    • Dementia Father    • Alcohol abuse Maternal Grandfather    • Alcohol abuse Maternal Grandmother      Allergies:      Hydralazine    Home Medications:    Prior to Admission Medications     Prescriptions Last Dose Informant Patient Reported? Taking?    beclomethasone (QVAR) 40 MCG/ACT inhaler  Medication Bottle Yes Yes    Inhale 1 puff 2 (two) times a day.    carvedilol (COREG) 25 MG tablet  Self Yes Yes    Take 25 mg by mouth 2 (two) times a day with meals.    diltiazem (TIAZAC) 360 MG 24 hr capsule  Medication Bottle Yes Yes    Take 360 mg by mouth daily.    dolutegravir (TIVICAY) 50 MG tablet  Medication Bottle Yes Yes    Take 50 mg by mouth daily.    emtricitabine-tenofovir (TRUVADA) 200-300 MG per tablet  Medication Bottle Yes Yes    Take 1 tablet by mouth daily.    Multiple Vitamins-Minerals (EQ COMPLETE MULTIVITAMIN-ADULT PO)  Medication Bottle Yes Yes    Take 1 tablet by mouth every night.    pantoprazole (PROTONIX) 40 MG EC tablet  Medication Bottle Yes Yes    Take 40 mg by mouth daily.    Prenatal Vit-Fe Fumarate-FA (VOL-PLUS) 27-1 MG tablet   Yes Yes    Take 1 tablet by mouth every night. To be taken 6 hours apart from Tivicay    sulfamethoxazole-trimethoprim (BACTRIM DS,SEPTRA DS) 800-160 MG per tablet  Self Yes Yes    Take 1 tablet by mouth daily.    warfarin (COUMADIN) 4 MG tablet  Medication Bottle Yes Yes    Take 4 mg by mouth daily. To only be taken Monday through Friday    spironolactone (ALDACTONE) 25 MG tablet  Medication Bottle Yes No    Take 25 mg by mouth daily.    warfarin (COUMADIN) 3 MG tablet  Self Yes No    Take 3 mg by mouth 2 (two) times a week. Takes on Saturday and Sunday             Hospital Scheduled Meds:      Current Facility-Administered Medications:   •  levoFLOXacin (LEVAQUIN) 750 mg/150 mL D5W (premix) (LEVAQUIN) 750 mg, 750 mg,  Intravenous, Once, Ryan Gamboa MD  •  morphine 2 MG/ML injection  - ADS Override Pull, , , ,   •  Insert peripheral IV, , , Once **AND** sodium chloride 0.9 % flush 10 mL, 10 mL, Intravenous, PRN, Ryan Gamboa MD    levoFLOXacin 750 mg Intravenous Once   morphine           Vital Signs:    Temp:  [97.7 °F (36.5 °C)] 97.7 °F (36.5 °C)  Heart Rate:  [59-70] 70  Resp:  [20] 20  BP: (120-132)/(78-84) 120/84  Last 3 weights    11/12/17  1810   Weight: 235 lb (107 kg)     Body mass index is 33.72 kg/(m^2).    Physical Exam:      General Appearance:    Alert, in respiratory distress, tachypneic and unable to               complete his sentences; BiPAP in place    Head:    Normocephalic, without obvious abnormality, atraumatic   Eyes:            Lids and lashes normal, conjunctivae and sclerae normal, no   icterus, no pallor, corneas clear, PERRLA   Ears:    Ears appear intact with no abnormalities noted   Throat:   No oral lesions, no thrush, oral mucosa moist   Neck:   No adenopathy, supple, trachea midline, no thyromegaly, no     carotid bruit, no JVD   Back:     No kyphosis present, no scoliosis present, no skin lesions,       erythema or scars, no tenderness to percussion or                   palpation,   range of motion normal   Lungs:     Breath sounds are diminished bilaterally, bilateral diffuse          expiratory wheezes noted with occasional rhonchi     Heart:    Regular rhythm and normal rate, normal S1 and S2, no            murmur, no gallop, no rub, no click   Chest Wall:    No abnormalities observed   Abdomen:     Normal bowel sounds, no masses, no organomegaly, soft        non-tender, non-distended, no guarding, no rebound                 tenderness   Rectal:     Deferred   Extremities:   Moves all extremities well, no edema, no cyanosis, no              redness   Pulses:   Pulses palpable and equal bilaterally   Skin:   No bleeding, bruising or rash   Lymph nodes:   No palpable  adenopathy   Neurologic:   Cranial nerves 2 - 12 grossly intact, sensation intact, DTR        present and equal bilaterally       EKG:      Pending at the time of this dictation; reported by ER provider [sinus rhythm, ventricular rate 61/m, [place] interval to 18, , first-degree AV block]    Telemetry:      Sinus rhythm, 80s and 90s per minute    I have personally looked at both the EKG and the telemetry strips.    Labs:    Results from last 7 days  Lab Units 11/12/17  1845 11/12/17 1820   LACTATE mmol/L  --  1.7   WBC 10*3/mm3  --  6.37   HEMOGLOBIN g/dL  --  15.3   HEMATOCRIT %  --  43.1   MCV fL  --  92.3   MCHC g/dL  --  35.5   PLATELETS 10*3/mm3  --  158   INR  4.23*  --        Results from last 7 days  Lab Units 11/12/17  1825   PH, ARTERIAL pH units 7.406   PO2 ART mm Hg 70.4*   PCO2, ARTERIAL mm Hg 36.9   HCO3 ART mmol/L 23.2       Results from last 7 days  Lab Units 11/12/17  1820   SODIUM mmol/L 131*   POTASSIUM mmol/L 4.6   CHLORIDE mmol/L 93*   CO2 mmol/L 20.0*   BUN mg/dL 10   CREATININE mg/dL 1.00   EGFR IF NONAFRICN AM mL/min/1.73 78   CALCIUM mg/dL 8.7   GLUCOSE mg/dL 112*   ALBUMIN g/dL 4.90   BILIRUBIN mg/dL 1.1   ALK PHOS U/L 69   AST (SGOT) U/L 53*   ALT (SGPT) U/L 48   Estimated Creatinine Clearance: 104.6 mL/min (by C-G formula based on Cr of 1).  No results found for: AMMONIA    Results from last 7 days  Lab Units 11/12/17  1820   TROPONIN I ng/mL 0.026       Results from last 7 days  Lab Units 11/12/17  1820   PROBNP pg/mL 123.0     Lab Results   Component Value Date    HGBA1C 5.0 12/02/2015     No results found for: TSH, FREET4  No results found for: PREGTESTUR, PREGSERUM, HCG, HCGQUANT  Pain Management Panel     Pain Management Panel Latest Ref Rng & Units 11/12/2017 9/24/2016    AMPHETAMINES SCREEN, URINE Negative Negative Negative    BARBITURATES SCREEN Negative Negative Negative    BENZODIAZEPINE SCREEN, URINE Negative Negative Negative    BUPRENORPHINE Negative Negative -    COCAINE  SCREEN, URINE Negative Negative Negative    METHADONE SCREEN, URINE Negative Negative Negative    METHAMPHETAMINE UR Negative Negative Negative              Radiology:    Imaging Results (most recent)     Procedure Component Value Units Date/Time    XR Chest 1 View [[id number]] Updated:  11/12/17 1613          Assessment:    · Community-acquired bilateral lower lobe pneumonia, organism unidentified, Present on admission  · Acute hypoxic respiratory failure, likely multifactorial secondary to pneumonia and alcohol intoxication, present on admission  · Acute alcohol intoxication with alcohol level 266 on arrival; at risk for alcohol withdrawal/delirium tremens, present on admission  · Status post an episode of unresponsiveness, hypopnea and suspected aspiration likely secondary to alcohol intoxication  · Chronic hepatitis secondary to alcohol with AST/ALT 53/48  · HIV infection with CD4 for count 198 per patient  · History of DVT requiring chronic anticoagulation, INR is supratherapeutic  · Suspected sleep apnea hypopnea syndrome based on clinical assessment    Plan:     · Patient is admitted to intensive care unit on the hospitalist service  · Empiric IV antibiotics in the form of Zosyn and Levaquin pending cultures  · Follow on blood cultures/pending, repeat lactic acid  · Bronchodilator nebulizers, IV steroids, supplemental oxygen and BiPAP ordered   · Maintenance IV fluid in the form of normal saline at 100 ML per hour; fluid resuscitation is not warranted as patient does not meet criteria for sepsis  · Alcohol withdrawal protocol ordered  · Preadmission medications reviewed, reconciled and reviewed including antiretrovirals and by mouth Bactrim   · Preadmission Coumadin was held due to supratherapeutic INR, repeat PT/INR in a.m. Ordered  · DVT prophylaxis deferred due to supratherapeutic INR, IPC device ordered  · Will maintain FULL CODE STATUS per patient's wishes  · Patient was informed regarding his condition  and management plan, he expressed understanding and agreement with the same      Jillian Cruz MD  11/12/17  9:17 PM

## 2017-11-13 NOTE — PAYOR COMM NOTE
"TO:LISA  FROM:LILIAN HAMPTON, RN PHONE [phone]  INPT CLINICALS AND NOTIFICATION    Tiana Bassett (52 y.o. Male)     Date of Birth Social Security Number Address Home Phone MRN    1965  [id number]  [facility] [place] [id number] [phone] [id number]    Jehovah's witness Marital Status          None Single       Admission Date Admission Type Admitting Provider Attending Provider Department, Room/Bed    17 Emergency Jillian Cruz MD Majumder, Mosumi, MD [facility] INTENSIVE CARE, I03    Discharge Date Discharge Disposition Discharge Destination                      Attending Provider: Clarisa Mancilla MD     Allergies:  Hydralazine    Isolation:  None   Infection:  None   Code Status:  FULL    Ht:  70\" (177.8 cm)   Wt:  241 lb 14.4 oz (110 kg)    Admission Cmt:  None   Principal Problem:  None                Active Insurance as of 2017     Primary Coverage     Payor Plan Insurance Group Employer/Plan Group    [facility]      Payor Plan Address Payor Plan Phone Number Effective From Effective To    [address] [address]  10/13/2015     [place] [address]       Subscriber Name Subscriber Birth Date Member ID       TIANA BASSETT 1965 [id number]                 Emergency Contacts      (Rel.) Home Phone Work Phone Mobile Phone    Miss Keith (Mother) [phone] -- --    Quin Bassett [phone] -- --               History & Physical      Jillian Cruz MD at 2017  9:17 PM              [facility] HOSPITALIST   HISTORY AND PHYSICAL      Name:  Tiana Bassett   Age:  52 y.o.  Sex:  male  :  1965  MRN:  [id number]   Visit Number:  [id number]  Admission Date:  2017  Date Of Service:  17  Primary Care Physician:  Rosa Tucker MD    History Obtained From:    patient, nursing staff and medical record    Chief Complaint:     Pneumonia, hypoxemia    History Of " Presenting Illness:      Mr. Krishnan is a 52 years old gentleman with known history of HIV infection with CD4 count 198 and chronic alcohol dependency who presented to the emergency room via EMS due to altered mental status.    On initial assessment, I was unable to obtain information from patient due to his clinical condition, therefore the following information was obtained from medical record.  Apparently patient was found by his nephew laying on the ground, cyanotic, unresponsive and [not breathing].  He woke him up and observed him for the next few hours with no improvement.  Meanwhile he became short of breath with wheezing and stridor which prompted the transfer to the emergency room for further evaluation and management.    Upon arrival to the emergency room, initial vital signs included temperature 97.6°F, heart rate 68/m, respiratory rate 19/m, blood pressure 133/90, O2 sats 96% on 2 L nasal cannula.  Patient was lethargic but increasingly tachypneic.  His workup was significant for  In the 260s alcohol level in the 260s; INR was supratherapeutic at 4.23, hyponatremia at 131, ABG revealed pH 7.40, PO2 70 and PCO2 36 on 3 L nasal cannula.  Chest x-ray reported bilateral lower lobe pneumonia; EKG was negative for acute findings.    During his stay in the emergency room, patient received IV Solu-Medrol, racemic epinephrine, IV Zosyn and levofloxacin after obtaining blood cultures.  Despite the same, patient continued to be in respiratory distress with diffuse wheezing, therefore was admitted to intensive care unit for further evaluation and management next    Upon arrival to ICU patient was on 100% nonrebreather; he was tachypneic and in respiratory distress with diffuse expiratory wheezing.  Nebulizers were administered with minimal improvement.  Patient became increasingly anxious as he was using accessory respiratory muscles with signs of distress.  Impending hypoxic respiratory failure prompted  consideration for intubation and mechanical ventilation.  However a trial of BiPAP was started along with 1 mg morphine.  Patient gradually improved and eventually was able to rest with no signs of distress.  On repeat a.m. rounds, patient was awake and comfortable.  He was transitioned to Ventimask on which she maintain sats in the 90s.  On exam, occasional expiratory wheezing was noted.    Review Of Systems:  (information obtained is limited due to patient's condition)     General ROS: positive for  - fatigue  Psychological ROS: positive for - anxiety  Ophthalmic ROS: negative  ENT ROS: negative  Allergy and Immunology ROS: negative  Hematological and Lymphatic ROS: negative  Endocrine ROS: negative  Breast ROS: negative for breast lumps  Respiratory ROS: positive for - cough, shortness of breath, sputum changes, tachypnea and wheezing  Cardiovascular ROS: no chest pain or dyspnea on exertion  Gastrointestinal ROS: no abdominal pain, change in bowel habits, or black or bloody stools  Genito-Urinary ROS: no dysuria, trouble voiding, or hematuria  Musculoskeletal ROS: negative  Neurological ROS: no TIA or stroke symptoms  Dermatological ROS: negative     Past Medical History:    Past Medical History:   Diagnosis Date   • Alcohol abuse    • Alcoholism    • Anxiety    • Cancer    • Chronic pain disorder    • HIV disease    • Liver disease    • Withdrawal symptoms, alcohol        Past Surgical history:    History reviewed. No pertinent surgical history.    Social History:    Unable to obtain from patient due to severe respiratory distress    Family History:    Family History   Problem Relation Age of Onset   • Dementia Mother    • Alcohol abuse Father    • Dementia Father    • Alcohol abuse Maternal Grandfather    • Alcohol abuse Maternal Grandmother      Allergies:      Hydralazine    Home Medications:    Prior to Admission Medications     Prescriptions Last Dose Informant Patient Reported? Taking?    beclomethasone  (QVAR) 40 MCG/ACT inhaler  Medication Bottle Yes Yes    Inhale 1 puff 2 (two) times a day.    carvedilol (COREG) 25 MG tablet  Self Yes Yes    Take 25 mg by mouth 2 (two) times a day with meals.    diltiazem (TIAZAC) 360 MG 24 hr capsule  Medication Bottle Yes Yes    Take 360 mg by mouth daily.    dolutegravir (TIVICAY) 50 MG tablet  Medication Bottle Yes Yes    Take 50 mg by mouth daily.    emtricitabine-tenofovir (TRUVADA) 200-300 MG per tablet  Medication Bottle Yes Yes    Take 1 tablet by mouth daily.    Multiple Vitamins-Minerals (EQ COMPLETE MULTIVITAMIN-ADULT PO)  Medication Bottle Yes Yes    Take 1 tablet by mouth every night.    pantoprazole (PROTONIX) 40 MG EC tablet  Medication Bottle Yes Yes    Take 40 mg by mouth daily.    Prenatal Vit-Fe Fumarate-FA (VOL-PLUS) 27-1 MG tablet   Yes Yes    Take 1 tablet by mouth every night. To be taken 6 hours apart from Tivicay    sulfamethoxazole-trimethoprim (BACTRIM DS,SEPTRA DS) 800-160 MG per tablet  Self Yes Yes    Take 1 tablet by mouth daily.    warfarin (COUMADIN) 4 MG tablet  Medication Bottle Yes Yes    Take 4 mg by mouth daily. To only be taken Monday through Friday    spironolactone (ALDACTONE) 25 MG tablet  Medication Bottle Yes No    Take 25 mg by mouth daily.    warfarin (COUMADIN) 3 MG tablet  Self Yes No    Take 3 mg by mouth 2 (two) times a week. Takes on Saturday and Sunday             Hospital Scheduled Meds:      Current Facility-Administered Medications:   •  levoFLOXacin (LEVAQUIN) 750 mg/150 mL D5W (premix) (LEVAQUIN) 750 mg, 750 mg, Intravenous, Once, Ryan Gamboa MD  •  morphine 2 MG/ML injection  - ADS Override Pull, , , ,   •  Insert peripheral IV, , , Once **AND** sodium chloride 0.9 % flush 10 mL, 10 mL, Intravenous, PRN, Ryan Gamboa MD    levoFLOXacin 750 mg Intravenous Once   morphine           Vital Signs:    Temp:  [97.7 °F (36.5 °C)] 97.7 °F (36.5 °C)  Heart Rate:  [59-70] 70  Resp:  [20] 20  BP:  (120-132)/(78-84) 120/84  Last 3 weights    11/12/17  1810   Weight: 235 lb (107 kg)     Body mass index is 33.72 kg/(m^2).    Physical Exam:      General Appearance:    Alert, in respiratory distress, tachypneic and unable to               complete his sentences; BiPAP in place    Head:    Normocephalic, without obvious abnormality, atraumatic   Eyes:            Lids and lashes normal, conjunctivae and sclerae normal, no   icterus, no pallor, corneas clear, PERRLA   Ears:    Ears appear intact with no abnormalities noted   Throat:   No oral lesions, no thrush, oral mucosa moist   Neck:   No adenopathy, supple, trachea midline, no thyromegaly, no     carotid bruit, no JVD   Back:     No kyphosis present, no scoliosis present, no skin lesions,       erythema or scars, no tenderness to percussion or                   palpation,   range of motion normal   Lungs:     Breath sounds are diminished bilaterally, bilateral diffuse          expiratory wheezes noted with occasional rhonchi     Heart:    Regular rhythm and normal rate, normal S1 and S2, no            murmur, no gallop, no rub, no click   Chest Wall:    No abnormalities observed   Abdomen:     Normal bowel sounds, no masses, no organomegaly, soft        non-tender, non-distended, no guarding, no rebound                 tenderness   Rectal:     Deferred   Extremities:   Moves all extremities well, no edema, no cyanosis, no              redness   Pulses:   Pulses palpable and equal bilaterally   Skin:   No bleeding, bruising or rash   Lymph nodes:   No palpable adenopathy   Neurologic:   Cranial nerves 2 - 12 grossly intact, sensation intact, DTR        present and equal bilaterally       EKG:      Pending at the time of this dictation; reported by ER provider [sinus rhythm, ventricular rate 61/m, NV interval to 18, , first-degree AV block]    Telemetry:      Sinus rhythm, 80s and 90s per minute    I have personally looked at both the EKG and the telemetry  strips.    Labs:    Results from last 7 days  Lab Units 11/12/17  1845 11/12/17  1820   LACTATE mmol/L  --  1.7   WBC 10*3/mm3  --  6.37   HEMOGLOBIN g/dL  --  15.3   HEMATOCRIT %  --  43.1   MCV fL  --  92.3   MCHC g/dL  --  35.5   PLATELETS 10*3/mm3  --  158   INR  4.23*  --        Results from last 7 days  Lab Units 11/12/17  1825   PH, ARTERIAL pH units 7.406   PO2 ART mm Hg 70.4*   PCO2, ARTERIAL mm Hg 36.9   HCO3 ART mmol/L 23.2       Results from last 7 days  Lab Units 11/12/17  1820   SODIUM mmol/L 131*   POTASSIUM mmol/L 4.6   CHLORIDE mmol/L 93*   CO2 mmol/L 20.0*   BUN mg/dL 10   CREATININE mg/dL 1.00   EGFR IF NONAFRICN AM mL/min/1.73 78   CALCIUM mg/dL 8.7   GLUCOSE mg/dL 112*   ALBUMIN g/dL 4.90   BILIRUBIN mg/dL 1.1   ALK PHOS U/L 69   AST (SGOT) U/L 53*   ALT (SGPT) U/L 48   Estimated Creatinine Clearance: 104.6 mL/min (by C-G formula based on Cr of 1).  No results found for: AMMONIA    Results from last 7 days  Lab Units 11/12/17  1820   TROPONIN I ng/mL 0.026       Results from last 7 days  Lab Units 11/12/17  1820   PROBNP pg/mL 123.0     Lab Results   Component Value Date    HGBA1C 5.0 12/02/2015     No results found for: TSH, FREET4  No results found for: PREGTESTUR, PREGSERUM, HCG, HCGQUANT  Pain Management Panel     Pain Management Panel Latest Ref Rng & Units 11/12/2017 9/24/2016    AMPHETAMINES SCREEN, URINE Negative Negative Negative    BARBITURATES SCREEN Negative Negative Negative    BENZODIAZEPINE SCREEN, URINE Negative Negative Negative    BUPRENORPHINE Negative Negative -    COCAINE SCREEN, URINE Negative Negative Negative    METHADONE SCREEN, URINE Negative Negative Negative    METHAMPHETAMINE UR Negative Negative Negative              Radiology:    Imaging Results (most recent)     Procedure Component Value Units Date/Time    XR Chest 1 View [[id number]] Updated:  11/12/17 3507          Assessment:    · Community-acquired bilateral lower lobe pneumonia, organism unidentified, Present  on admission  · Acute hypoxic respiratory failure, likely multifactorial secondary to pneumonia and alcohol intoxication, present on admission  · Acute alcohol intoxication with alcohol level 266 on arrival; at risk for alcohol withdrawal/delirium tremens, present on admission  · Status post an episode of unresponsiveness, hypopnea and suspected aspiration likely secondary to alcohol intoxication  · Chronic hepatitis secondary to alcohol with AST/ALT 53/48  · HIV infection with CD4 for count 198 per patient  · History of DVT requiring chronic anticoagulation, INR is supratherapeutic  · Suspected sleep apnea hypopnea syndrome based on clinical assessment    Plan:     · Patient is admitted to intensive care unit on the hospitalist service  · Empiric IV antibiotics in the form of Zosyn and Levaquin pending cultures  · Follow on blood cultures/pending, repeat lactic acid  · Bronchodilator nebulizers, IV steroids, supplemental oxygen and BiPAP ordered   · Maintenance IV fluid in the form of normal saline at 100 ML per hour; fluid resuscitation is not warranted as patient does not meet criteria for sepsis  · Alcohol withdrawal protocol ordered  · Preadmission medications reviewed, reconciled and reviewed including antiretrovirals and by mouth Bactrim   · Preadmission Coumadin was held due to supratherapeutic INR, repeat PT/INR in a.m. Ordered  · DVT prophylaxis deferred due to supratherapeutic INR, IPC device ordered  · Will maintain FULL CODE STATUS per patient's wishes  · Patient was informed regarding his condition and management plan, he expressed understanding and agreement with the same      Jillian Cruz MD  11/12/17  9:17 PM     Electronically signed by Jillian Cruz MD at 11/13/2017  7:51 AM           Emergency Department Notes      Toyin Dwyer, RN at 11/12/2017  6:45 PM            Patient with labored respirations, and grunting. SpO2 88-89% on 4 liters nasal cannula.  Dr. Gamboa notified.  No new  "orders received at this time.       Toyin Dwyer RN  11/12/17 1907       Electronically signed by Toyin Dwyer RN at 11/12/2017  7:07 PM      Sarah Love at 11/12/2017  7:13 PM          Dr. Cruz called for Dr. Simon with answer.     Sarah Love  11/12/17 1914       Electronically signed by Sarah Love at 11/12/2017  7:14 PM        Vital Signs (last 24 hours)       11/12 0700  -  11/13 0659 11/13 0700  -  11/13 1238   Most Recent    Temp (°F) 97.2 -  98       97.6 (36.4)    Heart Rate 59 -  80      72     72    Resp 18 -  20       19    BP (!)68/21 -  140/88      169/98     169/98    SpO2 (%) (!)87 -  99       96          Hospital Medications (active)       Dose Frequency Start End    acetaminophen (TYLENOL) tablet 650 mg 650 mg Every 4 Hours PRN 11/12/2017     Sig - Route: Take 2 tablets by mouth Every 4 (Four) Hours As Needed for Mild Pain . - Oral    budesonide (PULMICORT) nebulizer solution 0.5 mg 0.5 mg 2 Times Daily - RT 11/12/2017     Sig - Route: Take 2 mL by nebulization 2 (Two) Times a Day. - Nebulization    carvedilol (COREG) tablet 25 mg 25 mg 2 Times Daily With Meals 11/12/2017     Sig - Route: Take 1 tablet by mouth 2 (Two) Times a Day With Meals. - Oral    diltiaZEM CD (CARDIZEM CD) 24 hr capsule 360 mg 360 mg Every 24 Hours Scheduled 11/13/2017     Sig - Route: Take 2 capsules by mouth Daily. - Oral    emtricitabine-tenofovir (TRUVADA) 200-300 MG per tablet 1 tablet 1 tablet Daily 11/13/2017     Sig - Route: Take 1 tablet by mouth Daily. - Oral    folic acid (FOLVITE) tablet 1 mg 1 mg Daily 11/13/2017 11/16/2017    Sig - Route: Take 1 tablet by mouth Daily. - Oral    Linked Group 1:  \"And\" Linked Group Details        HYDROcodone-acetaminophen (NORCO) 7.5-325 MG per tablet 1 tablet 1 tablet Every 4 Hours PRN 11/12/2017 11/22/2017    Sig - Route: Take 1 tablet by mouth Every 4 (Four) Hours As Needed for Moderate Pain . - Oral    ipratropium-albuterol (DUO-NEB) nebulizer " "solution 3 mL 3 mL Once 11/12/2017 11/12/2017    Sig - Route: Take 3 mL by nebulization 1 (One) Time. - Nebulization    ipratropium-albuterol (DUO-NEB) nebulizer solution 3 mL 3 mL Every 4 Hours PRN 11/12/2017 11/19/2017    Sig - Route: Take 3 mL by nebulization Every 4 (Four) Hours As Needed for Shortness of Air. - Nebulization    levoFLOXacin (LEVAQUIN) 750 mg/150 mL D5W (premix) (LEVAQUIN) 750 mg 750 mg Once 11/12/2017 11/12/2017    Sig - Route: Infuse 150 mL into a venous catheter 1 (One) Time. - Intravenous    LORazepam (ATIVAN) injection 1 mg 1 mg Every 2 Hours PRN 11/12/2017 11/22/2017    Sig - Route: Infuse 0.5 mL into a venous catheter Every 2 (Two) Hours As Needed for Withdrawal (for CIWA-Ar 8-10). - Intravenous    Linked Group 2:  \"Or\" Linked Group Details        LORazepam (ATIVAN) injection 2 mg 2 mg Every 1 Hour PRN 11/12/2017 11/22/2017    Sig - Route: Infuse 1 mL into a venous catheter Every 1 (One) Hour As Needed for Withdrawal (for CIWA-Ar 11-15). - Intravenous    Linked Group 2:  \"Or\" Linked Group Details        LORazepam (ATIVAN) injection 2 mg 2 mg Every 15 Minutes PRN 11/12/2017 11/22/2017    Sig - Route: Infuse 1 mL into a venous catheter Every 15 (Fifteen) Minutes As Needed for Withdrawal (for CIWA-Ar greater than 15). - Intravenous    Linked Group 2:  \"Or\" Linked Group Details        LORazepam (ATIVAN) injection 2 mg 2 mg Every 15 Minutes PRN 11/12/2017 11/22/2017    Sig - Route: Inject 1 mL into the shoulder, thigh, or buttocks Every 15 (Fifteen) Minutes As Needed for Withdrawal (for CIWA-Ar greater than 15). - Intramuscular    Linked Group 2:  \"Or\" Linked Group Details        LORazepam (ATIVAN) injection 4 mg 4 mg Every 1 Hour PRN 11/12/2017 11/22/2017    Sig - Route: Infuse 2 mL into a venous catheter Every 1 (One) Hour As Needed for Withdrawal (for CIWA-Ar greater than 15). - Intravenous    Linked Group 2:  \"Or\" Linked Group Details        LORazepam (ATIVAN) tablet 1 mg 1 mg Every 2 Hours " "PRN 11/12/2017 11/22/2017    Sig - Route: Take 2 tablets by mouth Every 2 (Two) Hours As Needed for Withdrawal (for CIWA-Ar 8-10). - Oral    Linked Group 2:  \"Or\" Linked Group Details        LORazepam (ATIVAN) tablet 2 mg 2 mg Every 1 Hour PRN 11/12/2017 11/22/2017    Sig - Route: Take 4 tablets by mouth Every 1 (One) Hour As Needed for Withdrawal (for CIWA-Ar 11-15). - Oral    Linked Group 2:  \"Or\" Linked Group Details        LORazepam (ATIVAN) tablet 4 mg 4 mg Every 1 Hour PRN 11/12/2017 11/22/2017    Sig - Route: Take 2 tablets by mouth Every 1 (One) Hour As Needed for Withdrawal (for CIWA-Ar greater than 15). - Oral    Linked Group 2:  \"Or\" Linked Group Details        methylPREDNISolone sodium succinate (SOLU-Medrol) injection 125 mg 125 mg Once 11/12/2017 11/12/2017    Sig - Route: Infuse 2 mL into a venous catheter 1 (One) Time. - Intravenous    methylPREDNISolone sodium succinate (SOLU-Medrol) injection 80 mg 80 mg Every 8 Hours 11/13/2017 11/15/2017    Sig - Route: Infuse 1.28 mL into a venous catheter Every 8 (Eight) Hours. - Intravenous    morphine injection 1 mg 1 mg Once 11/12/2017 11/12/2017    Sig - Route: Infuse 0.5 mL into a venous catheter 1 (One) Time. - Intravenous    morphine injection 1 mg 1 mg Every 3 Hours PRN 11/12/2017     Sig - Route: Infuse 0.5 mL into a venous catheter Every 3 (Three) Hours As Needed for Severe Pain  (Respiratory distress). - Intravenous    multivitamin with minerals 1 tablet 1 tablet Daily 11/13/2017 11/16/2017    Sig - Route: Take 1 tablet by mouth Daily. - Oral    Linked Group 1:  \"And\" Linked Group Details        pantoprazole (PROTONIX) EC tablet 40 mg 40 mg Daily 11/13/2017     Sig - Route: Take 1 tablet by mouth Daily. - Oral    Pharmacy to dose warfarin  Continuous PRN 11/12/2017     Sig - Route: Continuous As Needed for Consult (to keep INR . - Does not apply    piperacillin-tazobactam (ZOSYN) 4.5 g in iso-osmotic dextrose 100 mL IVPB (premix) 4.5 g Once " "11/12/2017 11/12/2017    Sig - Route: Infuse 100 mL into a venous catheter 1 (One) Time. - Intravenous    piperacillin-tazobactam (ZOSYN) 4.5 g in iso-osmotic dextrose 100 mL IVPB (premix) 4.5 g Every 8 Hours 11/13/2017 11/20/2017    Sig - Route: Infuse 100 mL into a venous catheter Every 8 (Eight) Hours. - Intravenous    racemic epinephrine (RACEPINEPHRINE) nebulizer solution 0.5 mL 0.5 mL Once 11/12/2017 11/12/2017    Sig - Route: Take 0.5 mL by nebulization 1 (One) Time. - Nebulization    sodium chloride 0.9 % flush 1-10 mL 1-10 mL As Needed 11/12/2017     Sig - Route: Infuse 1-10 mL into a venous catheter As Needed for Line Care. - Intravenous    sodium chloride 0.9 % flush 10 mL 10 mL As Needed 11/12/2017     Sig - Route: Infuse 10 mL into a venous catheter As Needed for Line Care. - Intravenous    Linked Group 3:  \"And\" Linked Group Details        sodium chloride 0.9 % infusion 100 mL/hr Continuous 11/12/2017     Sig - Route: Infuse 100 mL/hr into a venous catheter Continuous. - Intravenous    sulfamethoxazole-trimethoprim (BACTRIM DS,SEPTRA DS) 800-160 MG per tablet 160 mg 1 tablet Daily 11/13/2017 11/20/2017    Sig - Route: Take 1 tablet by mouth Daily. - Oral    thiamine (VITAMIN B-1) tablet 100 mg 100 mg Daily 11/13/2017 11/16/2017    Sig - Route: Take 1 tablet by mouth Daily. - Oral    Linked Group 1:  \"And\" Linked Group Details        emtricitabine-tenofovir (TRUVADA) 200-300 MG per tablet 1 tablet (Discontinued) 1 tablet Every 24 Hours Scheduled 11/13/2017 11/12/2017    Sig - Route: Take 1 tablet by mouth Daily. - Oral    folic acid (FOLVITE) tablet 1 mg (Discontinued) 1 mg Daily 11/14/2017 11/13/2017    Sig - Route: Take 1 tablet by mouth Daily. - Oral    Linked Group 1:  \"And\" Linked Group Details        multiple vitamin (M.V.I. Adult) 10 mL, thiamine (B-1) 100 mg, folic acid 1 mg, magnesium sulfate 2 g in sodium chloride 0.9 % 1,000 mL infusion (Discontinued) 100 mL/hr Daily 11/13/2017 11/13/2017    " "Sig - Route: Infuse 100 mL/hr into a venous catheter Daily. - Intravenous    multivitamin with minerals 1 tablet (Discontinued) 1 tablet Daily 11/14/2017 11/13/2017    Sig - Route: Take 1 tablet by mouth Daily. - Oral    Linked Group 1:  \"And\" Linked Group Details        piperacillin-tazobactam (ZOSYN) 4.5 g in iso-osmotic dextrose 100 mL IVPB (premix) (Discontinued) 4.5 g Every 6 Hours 11/12/2017 11/12/2017    Sig - Route: Infuse 100 mL into a venous catheter Every 6 (Six) Hours. - Intravenous    thiamine (VITAMIN B-1) tablet 100 mg (Discontinued) 100 mg Daily 11/14/2017 11/13/2017    Sig - Route: Take 1 tablet by mouth Daily. - Oral    Linked Group 1:  \"And\" Linked Group Details                Lab Results (last 24 hours)     Procedure Component Value Units Date/Time    Blood Gas, Arterial With Co-Ox [[id number]]  (Abnormal) Collected:  11/12/17 1825    Specimen:  Arterial Blood Updated:  11/12/17 1824     Site Arterial: left radial     Matheus's Test Positive     pH, Arterial 7.406 pH units      pCO2, Arterial 36.9 mm Hg      pO2, Arterial 70.4 (L) mm Hg      HCO3, Arterial 23.2 mmol/L      Base Excess, Arterial -1.5 mmol/L      O2 Saturation, Arterial 93.9 %      Hemoglobin, Blood Gas 16.2 g/dL      Oxyhemoglobin 91.1 (L) %      Methemoglobin 0.90 %      Carboxyhemoglobin 2.1 %      Modality Cannula - Nasal     FIO2 36 %     CBC & Differential [[id number]] Collected:  11/12/17 1820    Specimen:  Blood Updated:  11/12/17 1827    Narrative:       The following orders were created for panel order CBC & Differential.  Procedure                               Abnormality         Status                     ---------                               -----------         ------                     CBC Auto Differential[[id number]]        Abnormal            Final result                 Please view results for these tests on the individual orders.    CBC Auto Differential [[id number]]  (Abnormal) Collected:  11/12/17 1820    " Specimen:  Blood Updated:  11/12/17 1827     WBC 6.37 10*3/mm3      RBC 4.67 (L) 10*6/mm3      Hemoglobin 15.3 g/dL      Hematocrit 43.1 %      MCV 92.3 fL      MCH 32.8 (H) pg      MCHC 35.5 g/dL      RDW 12.2 %      RDW-SD 41.6 fl      MPV 9.7 fL      Platelets 158 10*3/mm3      Neutrophil % 61.7 %      Lymphocyte % 24.3 %      Monocyte % 9.3 %      Eosinophil % 3.3 %      Basophil % 1.1 %      Immature Grans % 0.3 %      Neutrophils, Absolute 3.93 10*3/mm3      Lymphocytes, Absolute 1.55 10*3/mm3      Monocytes, Absolute 0.59 10*3/mm3      Eosinophils, Absolute 0.21 10*3/mm3      Basophils, Absolute 0.07 10*3/mm3      Immature Grans, Absolute 0.02 10*3/mm3      nRBC 0.0 /100 WBC     Lactic Acid, Plasma [[id number]]  (Normal) Collected:  11/12/17 1820    Specimen:  Blood Updated:  11/12/17 1840     Lactate 1.7 mmol/L     Narrative:       Hemolysis noted.    BNP [[id number]]  (Normal) Collected:  11/12/17 1820    Specimen:  Blood Updated:  11/12/17 1851     proBNP 123.0 pg/mL     Narrative:       Hemolysis noted.    Ethanol [[id number]]  (Abnormal) Collected:  11/12/17 1820    Specimen:  Blood Updated:  11/12/17 1851     Ethanol 266 (H) mg/dL      Ethanol % 0.266 %     Narrative:       This result is for medical use only and should not be used for forensic purposes.    Hemolysis noted.    Troponin [[id number]]  (Normal) Collected:  11/12/17 1820    Specimen:  Blood Updated:  11/12/17 1851     Troponin I 0.026 ng/mL       Specimen hemolyzed.  Results may be affected.       Narrative:       Normal Patient Upper Reference Limit (URL) (99th Percentile)=0.03 ng/mL   Non-AMI Illness Reference Limit=0.03-0.11 ng/mL   AMI Confirmation=0.12 ng/mL and above    Hemolysis noted.    Comprehensive Metabolic Panel [[id number]]  (Abnormal) Collected:  11/12/17 1820    Specimen:  Blood Updated:  11/12/17 1852     Glucose 112 (H) mg/dL      BUN 10 mg/dL       Specimen hemolyzed. Results may be affected.        Creatinine 1.00 mg/dL       Sodium 131 (L) mmol/L      Potassium 4.6 mmol/L       Specimen hemolyzed.  Results may be affected.        Chloride 93 (L) mmol/L      CO2 20.0 (L) mmol/L      Calcium 8.7 mg/dL      Total Protein 8.4 (H) g/dL       Specimen hemolyzed.  Results may be affected.        Albumin 4.90 g/dL       Specimen hemolyzed. Results may be affected.        ALT (SGPT) 48 U/L       Specimen hemolyzed.  Results may be affected.        AST (SGOT) 53 (H) U/L       Specimen hemolyzed.  Results may be affected.        Alkaline Phosphatase 69 U/L       Specimen hemolyzed. Results may be affected.        Total Bilirubin 1.1 mg/dL      eGFR Non African Amer 78 mL/min/1.73      Globulin 3.5 gm/dL      A/G Ratio 1.4 g/dL      BUN/Creatinine Ratio 10.0     Anion Gap 22.6 mmol/L     Narrative:       Abnormal estimated GFR should be followed by more specific studies to confirm end stage chronic renal disease. The equation used for calculation may not be accurate for patients less than 19 years old, greater than 70 years old, patients at extremes of weight, malnutrition, or with acute renal dysfunction.    Hemolysis noted.    Urine Drug Screen - Urine, Clean Catch [[id number]]  (Normal) Collected:  11/12/17 1857    Specimen:  Urine from Urine, Clean Catch Updated:  11/12/17 1915     THC, Screen, Urine Negative     Phencyclidine (PCP), Urine Negative     Cocaine Screen, Urine Negative     Methamphetamine, Urine Negative     Opiate Screen Negative     Amphetamine Screen, Urine Negative     Benzodiazepine Screen, Urine Negative     Tricyclic Antidepressants Screen Negative     Methadone Screen, Urine Negative     Barbiturates Screen, Urine Negative     Oxycodone Screen, Urine Negative     Propoxyphene Screen Negative     Buprenorphine, Screen, Urine Negative    Narrative:       Limitations of this procedure include the possibility of false positives due to interfering substances in the urine sample. Clinical data should be correlated with any  questionable result. Positive results should be considered Presumptive Positive until results are confirmed with another methodology such as HPLC or GCMS.    Urinalysis With / Culture If Indicated - Urine, Clean Catch [[id number]]  (Normal) Collected:  11/12/17 1857    Specimen:  Urine from Urine, Clean Catch Updated:  11/12/17 1923     Color, UA Yellow     Appearance, UA Clear     pH, UA 6.0     Specific Gravity, UA <=1.005     Glucose, UA Negative     Ketones, UA Negative     Bilirubin, UA Negative     Blood, UA Negative     Protein, UA Negative     Leuk Esterase, UA Negative     Nitrite, UA Negative     Urobilinogen, UA 0.2 E.U./dL    Narrative:       Urine microscopic not indicated.    Protime-INR [[id number]]  (Abnormal) Collected:  11/12/17 1845    Specimen:  Blood Updated:  11/12/17 1925     Protime 46.4 (H) Seconds      INR 4.23 (C)    Influenza Antigen, Rapid - Swab, Nasopharynx [[id number]]  (Normal) Collected:  11/12/17 1901    Specimen:  Swab from Nasopharynx Updated:  11/12/17 1933     Influenza A Ag, EIA Negative     Influenza B Ag, EIA Negative    Protime-INR [[id number]]  (Abnormal) Collected:  11/13/17 0433    Specimen:  Blood Updated:  11/13/17 0612     Protime 59.8 (H) Seconds      INR 5.37 (C)    Blood Culture With AYSHA - Blood, [[id number]]  (Normal) Collected:  11/12/17 1835    Specimen:  Blood from Arm, Left Updated:  11/13/17 0701     Blood Culture No growth at less than 24 hours    Blood Culture With AYSHA - Blood, [[id number]]  (Normal) Collected:  11/12/17 1845    Specimen:  Blood from Arm, Left Updated:  11/13/17 0701     Blood Culture No growth at less than 24 hours        Consult Notes (last 24 hours) (Notes from 11/12/2017 12:39 PM through 11/13/2017 12:39 PM)     No notes of this type exist for this encounter.

## 2017-11-14 LAB
ALBUMIN SERPL-MCNC: 4.4 G/DL (ref 3.5–5)
ALBUMIN/GLOB SERPL: 1.4 G/DL (ref 1–2)
ALP SERPL-CCNC: 62 U/L (ref 38–126)
ALT SERPL W P-5'-P-CCNC: 40 U/L (ref 13–69)
ANION GAP SERPL CALCULATED.3IONS-SCNC: 18.2 MMOL/L
AST SERPL-CCNC: 24 U/L (ref 15–46)
BILIRUB SERPL-MCNC: 0.6 MG/DL (ref 0.2–1.3)
BUN BLD-MCNC: 13 MG/DL (ref 7–20)
BUN/CREAT SERPL: 13 (ref 6.3–21.9)
CALCIUM SPEC-SCNC: 9.3 MG/DL (ref 8.4–10.2)
CHLORIDE SERPL-SCNC: 104 MMOL/L (ref 98–107)
CO2 SERPL-SCNC: 24 MMOL/L (ref 26–30)
CREAT BLD-MCNC: 1 MG/DL (ref 0.6–1.3)
GFR SERPL CREATININE-BSD FRML MDRD: 78 ML/MIN/1.73
GLOBULIN UR ELPH-MCNC: 3.1 GM/DL
GLUCOSE BLD-MCNC: 168 MG/DL (ref 74–98)
INR PPP: 4.85 (ref 0.9–1.1)
POTASSIUM BLD-SCNC: 4.2 MMOL/L (ref 3.5–5.1)
PROT SERPL-MCNC: 7.5 G/DL (ref 6.3–8.2)
PROTHROMBIN TIME: 53.1 SECONDS (ref 9.3–12.1)
SODIUM BLD-SCNC: 142 MMOL/L (ref 137–145)

## 2017-11-14 PROCEDURE — 94660 CPAP INITIATION&MGMT: CPT

## 2017-11-14 PROCEDURE — 25010000002 PIPERACILLIN SOD-TAZOBACTAM PER 1 G: Performed by: INTERNAL MEDICINE

## 2017-11-14 PROCEDURE — 94799 UNLISTED PULMONARY SVC/PX: CPT

## 2017-11-14 PROCEDURE — 25010000002 METHYLPREDNISOLONE PER 125 MG: Performed by: INTERNAL MEDICINE

## 2017-11-14 PROCEDURE — 25010000002 LEVOFLOXACIN PER 250 MG: Performed by: INTERNAL MEDICINE

## 2017-11-14 PROCEDURE — 80053 COMPREHEN METABOLIC PANEL: CPT | Performed by: INTERNAL MEDICINE

## 2017-11-14 PROCEDURE — 25010000002 LORAZEPAM PER 2 MG: Performed by: INTERNAL MEDICINE

## 2017-11-14 PROCEDURE — 85610 PROTHROMBIN TIME: CPT | Performed by: INTERNAL MEDICINE

## 2017-11-14 PROCEDURE — 99232 SBSQ HOSP IP/OBS MODERATE 35: CPT | Performed by: INTERNAL MEDICINE

## 2017-11-14 RX ORDER — THIAMINE MONONITRATE (VIT B1) 100 MG
100 TABLET ORAL DAILY
Status: DISCONTINUED | OUTPATIENT
Start: 2017-11-14 | End: 2017-11-15 | Stop reason: HOSPADM

## 2017-11-14 RX ORDER — METHYLPREDNISOLONE SODIUM SUCCINATE 125 MG/2ML
40 INJECTION, POWDER, LYOPHILIZED, FOR SOLUTION INTRAMUSCULAR; INTRAVENOUS EVERY 12 HOURS
Status: DISCONTINUED | OUTPATIENT
Start: 2017-11-14 | End: 2017-11-15 | Stop reason: HOSPADM

## 2017-11-14 RX ADMIN — METHYLPREDNISOLONE SODIUM SUCCINATE 40 MG: 125 INJECTION, POWDER, FOR SOLUTION INTRAMUSCULAR; INTRAVENOUS at 22:03

## 2017-11-14 RX ADMIN — LORAZEPAM 2 MG: 2 INJECTION, SOLUTION INTRAMUSCULAR; INTRAVENOUS at 15:56

## 2017-11-14 RX ADMIN — DILTIAZEM HYDROCHLORIDE 360 MG: 180 CAPSULE, COATED, EXTENDED RELEASE ORAL at 08:40

## 2017-11-14 RX ADMIN — CARVEDILOL 25 MG: 25 TABLET, FILM COATED ORAL at 08:40

## 2017-11-14 RX ADMIN — CARVEDILOL 25 MG: 25 TABLET, FILM COATED ORAL at 17:56

## 2017-11-14 RX ADMIN — SULFAMETHOXAZOLE AND TRIMETHOPRIM 160 MG: 800; 160 TABLET ORAL at 08:40

## 2017-11-14 RX ADMIN — SODIUM CHLORIDE 100 ML/HR: 9 INJECTION, SOLUTION INTRAVENOUS at 22:03

## 2017-11-14 RX ADMIN — TAZOBACTAM SODIUM AND PIPERACILLIN SODIUM 4.5 G: 500; 4 INJECTION, SOLUTION INTRAVENOUS at 12:18

## 2017-11-14 RX ADMIN — IPRATROPIUM BROMIDE AND ALBUTEROL SULFATE 3 ML: .5; 3 SOLUTION RESPIRATORY (INHALATION) at 07:00

## 2017-11-14 RX ADMIN — METHYLPREDNISOLONE SODIUM SUCCINATE 80 MG: 125 INJECTION, POWDER, FOR SOLUTION INTRAMUSCULAR; INTRAVENOUS at 02:40

## 2017-11-14 RX ADMIN — METHYLPREDNISOLONE SODIUM SUCCINATE 80 MG: 125 INJECTION, POWDER, FOR SOLUTION INTRAMUSCULAR; INTRAVENOUS at 10:38

## 2017-11-14 RX ADMIN — LEVOFLOXACIN 500 MG: 5 INJECTION, SOLUTION INTRAVENOUS at 00:28

## 2017-11-14 RX ADMIN — FOLIC ACID 1 MG: 1 TABLET ORAL at 08:41

## 2017-11-14 RX ADMIN — IPRATROPIUM BROMIDE AND ALBUTEROL SULFATE 3 ML: .5; 3 SOLUTION RESPIRATORY (INHALATION) at 19:36

## 2017-11-14 RX ADMIN — HYDROCODONE BITARTRATE AND ACETAMINOPHEN 1 TABLET: 7.5; 325 TABLET ORAL at 15:56

## 2017-11-14 RX ADMIN — LEVOFLOXACIN 500 MG: 5 INJECTION, SOLUTION INTRAVENOUS at 22:03

## 2017-11-14 RX ADMIN — LORAZEPAM 1 MG: 0.5 TABLET ORAL at 22:03

## 2017-11-14 RX ADMIN — BUDESONIDE 0.5 MG: 0.5 INHALANT RESPIRATORY (INHALATION) at 07:00

## 2017-11-14 RX ADMIN — PANTOPRAZOLE SODIUM 40 MG: 40 TABLET, DELAYED RELEASE ORAL at 08:40

## 2017-11-14 RX ADMIN — TAZOBACTAM SODIUM AND PIPERACILLIN SODIUM 4.5 G: 500; 4 INJECTION, SOLUTION INTRAVENOUS at 04:00

## 2017-11-14 RX ADMIN — BUDESONIDE 0.5 MG: 0.5 INHALANT RESPIRATORY (INHALATION) at 19:35

## 2017-11-14 RX ADMIN — EMTRICITABINE AND TENOFOVIR DISOPROXIL FUMARATE 1 TABLET: 200; 300 TABLET, FILM COATED ORAL at 08:40

## 2017-11-14 RX ADMIN — MULTIPLE VITAMINS W/ MINERALS TAB 1 TABLET: TAB at 08:40

## 2017-11-14 RX ADMIN — Medication 100 MG: at 15:53

## 2017-11-14 NOTE — PROGRESS NOTES
"Hospitalist Progress Note.    LOS: 2 days    Patient Care Team:  Rosa Tucker MD as PCP - General    Chief Complaint:    Chief Complaint   Patient presents with   • Shortness of Breath       Subjective     Patient is seen and examined this morning.  He is s sitting up in a chair.  He reports feeling much better in terms of his breathing.  Denies any cough, chest pain, fevers, or chills.  He is asking about oxygen therapy at home.  Upon further questioning patient does report that he has frequent nocturnal awakenings.  He usually wakes up feeling not refreshed.  He believes he does snore at night.  He denies any abdominal pain, diarrhea, or constipation.    Review of Systems:    The pertinent  ROS was done and it is noted above, rest  was negative.    Objective     Vital Signs  /76 (BP Location: Left arm, Patient Position: Lying)  Pulse 62  Temp 97.6 °F (36.4 °C) (Oral)   Resp 18  Ht 70\" (177.8 cm)  Wt 239 lb 8 oz (109 kg)  SpO2 96%  BMI 34.36 kg/m2      I/O this shift:  In: 460 [P.O.:360; IV Piggyback:100]  Out: 250 [Urine:250]    Intake/Output Summary (Last 24 hours) at 11/14/17 1229  Last data filed at 11/14/17 1218   Gross per 24 hour   Intake             1700 ml   Output             2500 ml   Net             -800 ml       Physical Exam:    General Appearance: alert, oriented x 3, no acute distress, appears calm this morning  HEENT: pupils round and reactive to light, oral mucosa dry, extra occular movements intact.  Neck: supple, no JVD, trachea midline  Lungs: unlabored breathing effort, minimal expiratory wheezing   Heart: RRR, normal S1 and S2, no S3, no rub  Abdomen: soft, non-tender, no palpable bladder, present bowel sounds to auscultation  Extremities: no edema, cyanosis or clubbing.   Neuro: normal speech and mental status, grossly non focal.     Results Review:      Results from last 7 days  Lab Units 11/14/17  0530 11/12/17  1820   SODIUM mmol/L 142 131*   POTASSIUM mmol/L 4.2 4.6 "   CHLORIDE mmol/L 104 93*   CO2 mmol/L 24.0* 20.0*   BUN mg/dL 13 10   CREATININE mg/dL 1.00 1.00   CALCIUM mg/dL 9.3 8.7   BILIRUBIN mg/dL 0.6 1.1   ALK PHOS U/L 62 69   ALT (SGPT) U/L 40 48   AST (SGOT) U/L 24 53*   GLUCOSE mg/dL 168* 112*       Estimated Creatinine Clearance: 105.6 mL/min (by C-G formula based on Cr of 1).                  Results from last 7 days  Lab Units 11/12/17  1820   WBC 10*3/mm3 6.37   HEMOGLOBIN g/dL 15.3   PLATELETS 10*3/mm3 158         Results from last 7 days  Lab Units 11/14/17  0530 11/13/17  0433 11/12/17  1845   INR  4.85* 5.37* 4.23*         Imaging Results (last 24 hours)     ** No results found for the last 24 hours. **          budesonide 0.5 mg Nebulization BID - RT   carvedilol 25 mg Oral BID With Meals   diltiaZEM  mg Oral Q24H   emtricitabine-tenofovir 1 tablet Oral Daily   folic acid 1 mg Oral Daily   And      multivitamin with minerals 1 tablet Oral Daily   levoFLOXacin 500 mg Intravenous Q24H   methylPREDNISolone sodium succinate 40 mg Intravenous Q12H   pantoprazole 40 mg Oral Daily   piperacillin-tazobactam 4.5 g Intravenous Q8H   sulfamethoxazole-trimethoprim 1 tablet Oral Daily   vitamin B-1 100 mg Oral Daily       Pharmacy to dose warfarin     sodium chloride 100 mL/hr Last Rate: 100 mL/hr (11/12/17 2136)       Medication Review:   Current Facility-Administered Medications   Medication Dose Route Frequency Provider Last Rate Last Dose   • acetaminophen (TYLENOL) tablet 650 mg  650 mg Oral Q4H PRN Jillian Cruz MD       • budesonide (PULMICORT) nebulizer solution 0.5 mg  0.5 mg Nebulization BID - RT Jillian Cruz MD   0.5 mg at 11/14/17 0700   • carvedilol (COREG) tablet 25 mg  25 mg Oral BID With Meals Jillian Cruz MD   25 mg at 11/14/17 0840   • diltiaZEM CD (CARDIZEM CD) 24 hr capsule 360 mg  360 mg Oral Q24H Jillian Cruz MD   360 mg at 11/14/17 0840   • emtricitabine-tenofovir (TRUVADA) 200-300 MG per tablet 1 tablet  1 tablet Oral Daily  Jillian Cruz MD   1 tablet at 11/14/17 0840   • folic acid (FOLVITE) tablet 1 mg  1 mg Oral Daily Jillian Cruz MD   1 mg at 11/14/17 0841    And   • multivitamin with minerals 1 tablet  1 tablet Oral Daily Jillian Cruz MD   1 tablet at 11/14/17 0840   • HYDROcodone-acetaminophen (NORCO) 7.5-325 MG per tablet 1 tablet  1 tablet Oral Q4H PRN Jillian Cruz MD       • ipratropium-albuterol (DUO-NEB) nebulizer solution 3 mL  3 mL Nebulization Q4H PRN Jillian Cruz MD   3 mL at 11/14/17 0700   • levoFLOXacin (LEVAQUIN) 500 mg/100 mL D5W (premix) (LEVAQUIN) 500 mg  500 mg Intravenous Q24H Clarisa Mancilla  mL/hr at 11/14/17 0028 500 mg at 11/14/17 0028   • LORazepam (ATIVAN) tablet 1 mg  1 mg Oral Q2H PRN Jillian Cruz MD        Or   • LORazepam (ATIVAN) injection 1 mg  1 mg Intravenous Q2H PRN Jillian Cruz MD        Or   • LORazepam (ATIVAN) tablet 2 mg  2 mg Oral Q1H PRN Jillian Cruz MD        Or   • LORazepam (ATIVAN) injection 2 mg  2 mg Intravenous Q1H PRN Jillian Cruz MD        Or   • LORazepam (ATIVAN) injection 2 mg  2 mg Intravenous Q15 Min PRN Jillian Cruz MD        Or   • LORazepam (ATIVAN) injection 2 mg  2 mg Intramuscular Q15 Min PRN Jillian Cruz MD        Or   • LORazepam (ATIVAN) tablet 4 mg  4 mg Oral Q1H PRN Jillian Cruz MD        Or   • LORazepam (ATIVAN) injection 4 mg  4 mg Intravenous Q1H PRN Jillian Cruz MD       • methylPREDNISolone sodium succinate (SOLU-Medrol) injection 40 mg  40 mg Intravenous Q12H Clarisa Mancilla MD       • morphine injection 1 mg  1 mg Intravenous Q3H PRN Jillian Cruz MD       • pantoprazole (PROTONIX) EC tablet 40 mg  40 mg Oral Daily Jillian Cruz MD   40 mg at 11/14/17 0840   • Pharmacy to dose warfarin   Does not apply Continuous PRN Jillian Cruz MD       • piperacillin-tazobactam (ZOSYN) 4.5 g in iso-osmotic dextrose 100 mL IVPB (premix)  4.5 g Intravenous Q8H Jillian Cruz MD   4.5 g at 11/14/17 1218    • sodium chloride 0.9 % flush 1-10 mL  1-10 mL Intravenous PRN Jillian Cruz MD       • sodium chloride 0.9 % flush 10 mL  10 mL Intravenous PRN Ryan Gamboa MD       • sodium chloride 0.9 % infusion  100 mL/hr Intravenous Continuous Jillian Cruz  mL/hr at 11/12/17 2136 100 mL/hr at 11/12/17 2136   • sulfamethoxazole-trimethoprim (BACTRIM DS,SEPTRA DS) 800-160 MG per tablet 160 mg  1 tablet Oral Daily Jillian Cruz MD   160 mg at 11/14/17 0840   • thiamine (VITAMIN B-1) tablet 100 mg  100 mg Oral Daily Clarisa Mancilla MD           Assessment/Plan   1. Community-acquired bilateral lower lobe pneumonia in immunocompromised patient, organism unidentified, present on admission - will de-escalate antibiotics to Levaquin only. Zosyn will be discontinued. Steroids taper to 40mg q12h. Blood cultures negative so far. No leukocytosis or fevers.      2. Acute hypoxic respiratory failure, likely multifactorial secondary to pneumonia and alcohol intoxication, present on admission - Oxygen requirement down to 4L today. Continue to try and titrate off oxygen.     3. Acute alcohol intoxication with alcohol level 266 on arrival - Continue with CIWA protocol.  C/w thiamine and folic acid on discharge.     4. Status post an episode of unresponsiveness, hypopnea and suspected aspiration likely secondary to alcohol intoxication - currently alert and oriented and able to answer all my questions appropriately.  Urine toxicology negative.     5. Chronic alcoholic hepatitis     6. HIV/AIDS infection with CD4 for count 198 per patient - reports compliance with his medications including Truvada and Bactrim for PCP prophylaxis.     7. History of recurrent DVTs  requiring chronic anticoagulation, INR is supratherapeutic. Will hold coumadin and monitor INR and restart when appropriate. No need for vitamin K as patient is not bleeding.     8. Suspected sleep apnea hypopnea syndrome based on clinical assessment.  Will  benefit from sleep study as outpatient. Will refer to pulmonology on discharge.     Active Problems:    Pneumonia of both lungs due to infectious organism    Acute alcoholic intoxication without complication    Acute respiratory failure with hypoxia    Alcoholic hepatitis without ascites    Details were discussed with the patient.   I also discussed the details with the nursing staff.  Rest as ordered.    Clarisa Mancilla MD  11/14/17  12:29 PM    Please note that portions of this note may have been completed with a voice recognition program. Efforts were made to edit the dictations, but occasionally words are mistranscribed.

## 2017-11-14 NOTE — PLAN OF CARE
Problem: Patient Care Overview (Adult)  Goal: Plan of Care Review  Outcome: Ongoing (interventions implemented as appropriate)    11/14/17 0334   Coping/Psychosocial Response Interventions   Plan Of Care Reviewed With patient   Patient Care Overview   Progress improving   Outcome Evaluation   Outcome Summary/Follow up Plan Patients breath sounds are better and he states he is breathing better. Patient is now on oxygen at 4 liters.        Goal: Adult Individualization and Mutuality  Outcome: Ongoing (interventions implemented as appropriate)    11/14/17 0334   Individualization   Patient Specific Goals go home    Patient Specific Interventions taken off bipap and is now on 4 L oxygen       Goal: Discharge Needs Assessment  Outcome: Ongoing (interventions implemented as appropriate)    11/13/17 1312 11/14/17 0334   Discharge Needs Assessment   Concerns To Be Addressed financial/insurance concerns --    Readmission Within The Last 30 Days --  no previous admission in last 30 days   Equipment Needed After Discharge --  other (see comments)  (may need oxygen)   Discharge Disposition --  still a patient   Current Health   Anticipated Changes Related to Illness --  none   Self-Care   Equipment Currently Used at Home --  none   Living Environment   Transportation Available --  family or friend will provide;car         Problem: Pneumonia (Adult)  Goal: Signs and Symptoms of Listed Potential Problems Will be Absent or Manageable (Pneumonia)  Outcome: Ongoing (interventions implemented as appropriate)    11/14/17 0334   Pneumonia   Problems Assessed (Pneumonia) all   Problems Present (Pneumonia) respiratory compromise

## 2017-11-15 VITALS
OXYGEN SATURATION: 94 % | WEIGHT: 244.6 LBS | TEMPERATURE: 98.2 F | HEART RATE: 59 BPM | RESPIRATION RATE: 18 BRPM | BODY MASS INDEX: 35.02 KG/M2 | DIASTOLIC BLOOD PRESSURE: 85 MMHG | HEIGHT: 70 IN | SYSTOLIC BLOOD PRESSURE: 127 MMHG

## 2017-11-15 LAB
INR PPP: 3.17 (ref 0.9–1.1)
PROTHROMBIN TIME: 34.7 SECONDS (ref 9.3–12.1)

## 2017-11-15 PROCEDURE — 94799 UNLISTED PULMONARY SVC/PX: CPT

## 2017-11-15 PROCEDURE — 25010000002 METHYLPREDNISOLONE PER 125 MG: Performed by: INTERNAL MEDICINE

## 2017-11-15 PROCEDURE — 85610 PROTHROMBIN TIME: CPT | Performed by: INTERNAL MEDICINE

## 2017-11-15 PROCEDURE — 99239 HOSP IP/OBS DSCHRG MGMT >30: CPT | Performed by: INTERNAL MEDICINE

## 2017-11-15 PROCEDURE — 25010000002 LORAZEPAM PER 2 MG: Performed by: INTERNAL MEDICINE

## 2017-11-15 PROCEDURE — 94620 HC PULMONARY STRESS TEST SIMPLE: CPT

## 2017-11-15 RX ORDER — THIAMINE MONONITRATE (VIT B1) 100 MG
100 TABLET ORAL DAILY
Qty: 1 TABLET | Refills: 0 | Status: SHIPPED | OUTPATIENT
Start: 2017-11-15 | End: 2017-11-16

## 2017-11-15 RX ORDER — DOXYCYCLINE 100 MG/1
100 CAPSULE ORAL 2 TIMES DAILY
Qty: 16 CAPSULE | Refills: 0 | Status: SHIPPED | OUTPATIENT
Start: 2017-11-15 | End: 2018-02-26

## 2017-11-15 RX ORDER — ALBUTEROL SULFATE 90 UG/1
2 AEROSOL, METERED RESPIRATORY (INHALATION) EVERY 4 HOURS PRN
Qty: 18 G | Refills: 2 | Status: SHIPPED | OUTPATIENT
Start: 2017-11-15

## 2017-11-15 RX ORDER — METHYLPREDNISOLONE 4 MG/1
TABLET ORAL
Qty: 21 EACH | Refills: 0 | Status: SHIPPED | OUTPATIENT
Start: 2017-11-15 | End: 2018-02-26

## 2017-11-15 RX ORDER — FOLIC ACID 1 MG/1
1 TABLET ORAL DAILY
Qty: 30 TABLET | Refills: 0 | Status: ON HOLD | OUTPATIENT
Start: 2017-11-15 | End: 2020-11-17

## 2017-11-15 RX ADMIN — PANTOPRAZOLE SODIUM 40 MG: 40 TABLET, DELAYED RELEASE ORAL at 08:51

## 2017-11-15 RX ADMIN — DILTIAZEM HYDROCHLORIDE 360 MG: 180 CAPSULE, COATED, EXTENDED RELEASE ORAL at 08:51

## 2017-11-15 RX ADMIN — SULFAMETHOXAZOLE AND TRIMETHOPRIM 160 MG: 800; 160 TABLET ORAL at 08:51

## 2017-11-15 RX ADMIN — CARVEDILOL 25 MG: 25 TABLET, FILM COATED ORAL at 08:51

## 2017-11-15 RX ADMIN — HYDROCODONE BITARTRATE AND ACETAMINOPHEN 1 TABLET: 7.5; 325 TABLET ORAL at 06:20

## 2017-11-15 RX ADMIN — LORAZEPAM 2 MG: 2 INJECTION, SOLUTION INTRAMUSCULAR; INTRAVENOUS at 06:15

## 2017-11-15 RX ADMIN — IPRATROPIUM BROMIDE AND ALBUTEROL SULFATE 3 ML: .5; 3 SOLUTION RESPIRATORY (INHALATION) at 07:12

## 2017-11-15 RX ADMIN — BUDESONIDE 0.5 MG: 0.5 INHALANT RESPIRATORY (INHALATION) at 07:11

## 2017-11-15 RX ADMIN — MULTIPLE VITAMINS W/ MINERALS TAB 1 TABLET: TAB at 08:51

## 2017-11-15 RX ADMIN — METHYLPREDNISOLONE SODIUM SUCCINATE 40 MG: 125 INJECTION, POWDER, FOR SOLUTION INTRAMUSCULAR; INTRAVENOUS at 11:30

## 2017-11-15 RX ADMIN — FOLIC ACID 1 MG: 1 TABLET ORAL at 08:51

## 2017-11-15 NOTE — DISCHARGE SUMMARY
[facility]   DISCHARGE SUMMARY      Name:  Kitty Krishnan   Age:  52 y.o.  Sex:  male  :  1965  MRN:  [id number]   Visit Number:  [id number]    Admission Date:  2017  Date of Discharge:  11/15/2017  Primary Care Physician:  Rosa Tucker MD    Discharge Diagnoses:       1. Community-acquired bilateral lower lobe pneumonia in immunocompromised patient  2. Acute hypoxic respiratory failure, likely multifactorial secondary to pneumonia and alcohol intoxication   3. Acute alcohol intoxication with alcohol level 266 on arrival   4. Status post an episode of unresponsiveness, hypopnea and suspected aspiration likely secondary to alcohol intoxication   5. Chronic alcoholic hepatitis   6. HIV/AIDS infection with CD4 for count 198 per patient    7. History of recurrent DVTs  requiring chronic anticoagulation, INR is supratherapeutic.  8. Suspected sleep apnea hypopnea syndrome based on clinical assessment.     Principal Problem:    Acute respiratory failure with hypoxia  Active Problems:    Pneumonia of both lungs due to infectious organism    Acute alcoholic intoxication without complication    Alcoholic hepatitis without ascites      Presenting Problem:    Pneumonia of both lungs due to infectious organism, unspecified part of lung [J18.9]     Consults:     Consults     No orders found from 10/14/2017 to 2017.        Consulting Physician(s)             None            Procedures Performed:           History of presenting illness:  HPI obtained from H&P done on     Mr. Krishnan is a 52 years old gentleman with known history of HIV infection with CD4 count 198 and chronic alcohol dependency who presented to the emergency room via EMS due to altered mental status.     On initial assessment, I was unable to obtain information from patient due to his clinical condition, therefore the following information was obtained from medical record.  Apparently patient was found  by his nephew laying on the ground, cyanotic, unresponsive and [not breathing].  He woke him up and observed him for the next few hours with no improvement. Meanwhile, he became short of breath with wheezing and stridor which prompted the transfer to the emergency room for further evaluation and management.     Upon arrival to the emergency room, initial vital signs included temperature 97.6°F, heart rate 68/m, respiratory rate 19/m, blood pressure 133/90, O2 sats 96% on 2 L nasal cannula.  Patient was lethargic but increasingly tachypneic.  His workup was significant for  In the 260s alcohol level in the 260s; INR was supratherapeutic at 4.23, hyponatremia at 131, ABG revealed pH 7.40, PO2 70 and PCO2 36 on 3 L nasal cannula.  Chest x-ray reported bilateral lower lobe pneumonia; EKG was negative for acute findings.     During his stay in the emergency room, patient received IV Solu-Medrol, racemic epinephrine, IV Zosyn and levofloxacin after obtaining blood cultures.  Despite the same, patient continued to be in respiratory distress with diffuse wheezing, therefore was admitted to intensive care unit for further evaluation and management next     Upon arrival to ICU patient was on 100% nonrebreather; he was tachypneic and in respiratory distress with diffuse expiratory wheezing.  Nebulizers were administered with minimal improvement.  Patient became increasingly anxious as he was using accessory respiratory muscles with signs of distress.  Impending hypoxic respiratory failure prompted consideration for intubation and mechanical ventilation.  However a trial of BiPAP was started along with 1 mg morphine.  Patient gradually improved and eventually was able to rest with no signs of distress.  On repeat a.m. rounds, patient was awake and comfortable.  He was transitioned to Ventimask on which she maintain sats in the 90s. On exam, occasional expiratory wheezing was noted.    Hospital Course:  Patient's wheezing and  coughing improved significantly the day after admission. He was subsequently downgraded to the medical floor with telemetry and on continuous pulse oximetry. He however, continued to require high supplemental oxygen rate, therefore, he was continued on high dose IV steroids. On third day of admission, patient's respiratory status improved markedly. A 6 minute walk test was performed to assess him for home oxygen, his oxygen saturations remained in low to mid 90s which did not make him eligible for home oxygen therapy. He was discharged on his home inhalers - albuterol and Qvar as well as doxycycline and medrol dose chester. Given his symptoms of frequent nocturnal awakenings, snoring, and daytime sleepiness he was suspected to have OLGA for which he was referred to Dr. Gerard on an outpatient basis. He was advised to follow up with his PCP within 1-2 weeks of discharge.     He is restarted on his home BP medications as well as HIV medications.   He is started on thiamine and folic acid due to his history of chronic alcohol use.      He is stable for discharge at this time.       Vital Signs:    Temp:  [97.6 °F (36.4 °C)-98.2 °F (36.8 °C)] 98.2 °F (36.8 °C)  Heart Rate:  [56-67] 59  Resp:  [18-20] 18  BP: (125-137)/(76-85) 127/85    Physical Exam:    General Appearance:  Alert and cooperative, not in any acute distress.   Head:  Atraumatic and normocephalic, without obvious abnormality.   Eyes:          PERRLA, conjunctivae and sclerae normal, no Icterus. No pallor. Extra-occular movements are within normal limits.   Ears:  Ears appear intact with no abnormalities noted.   Throat: No oral lesions, no thrush, oral mucosa moist.   Neck: Supple, trachea midline, no thyromegaly, no carotid bruit.   Back:   No kyphoscoliosis present. No tenderness to palpation,   range of motion normal.   Lungs:   Chest shape is normal. Breath sounds heard bilaterally equally.  No crackles. No Pleural rub or bronchial breathing. Minimal  expiratory wheezing   Heart:  Normal S1 and S2, no murmur, no gallop, no rub. No JVD.   Abdomen:   Normal bowel sounds, no masses, no organomegaly. Soft, non-tender, non-distended, no guarding, no rebound tenderness.   Extremities: Moves all extremities well, no edema, no cyanosis, no clubbing.   Pulses: Pulses palpable and equal bilaterally.   Skin: No bleeding, bruising or rash.   Lymph nodes: No palpable adenopathy.   Neurologic: Alert and oriented x 3. Moves all four limbs equally. No tremors. No facial asymetry.     Pertinent Lab Results:       Results from last 7 days  Lab Units 11/14/17  0530 11/12/17  1820   SODIUM mmol/L 142 131*   POTASSIUM mmol/L 4.2 4.6   CHLORIDE mmol/L 104 93*   CO2 mmol/L 24.0* 20.0*   BUN mg/dL 13 10   CREATININE mg/dL 1.00 1.00   CALCIUM mg/dL 9.3 8.7   BILIRUBIN mg/dL 0.6 1.1   ALK PHOS U/L 62 69   ALT (SGPT) U/L 40 48   AST (SGOT) U/L 24 53*   GLUCOSE mg/dL 168* 112*       Results from last 7 days  Lab Units 11/12/17  1820   WBC 10*3/mm3 6.37   HEMOGLOBIN g/dL 15.3   HEMATOCRIT % 43.1   PLATELETS 10*3/mm3 158       Results from last 7 days  Lab Units 11/15/17  0543 11/14/17  0530 11/13/17  0433   INR  3.17* 4.85* 5.37*       Results from last 7 days  Lab Units 11/12/17  1820   TROPONIN I ng/mL 0.026       Results from last 7 days  Lab Units 11/12/17  1820   PROBNP pg/mL 123.0               Results from last 7 days  Lab Units 11/12/17  1825   PH, ARTERIAL pH units 7.406   PO2 ART mm Hg 70.4*   PCO2, ARTERIAL mm Hg 36.9   HCO3 ART mmol/L 23.2       Results from last 7 days  Lab Units 11/12/17  1857   COLOR UA  Yellow   GLUCOSE UA  Negative   KETONES UA  Negative   LEUKOCYTES UA  Negative   PH, URINE  6.0   BILIRUBIN UA  Negative   UROBILINOGEN UA  0.2 E.U./dL     Pain Management Panel     Pain Management Panel Latest Ref Rng & Units 11/12/2017 9/24/2016    AMPHETAMINES SCREEN, URINE Negative Negative Negative    BARBITURATES SCREEN Negative Negative Negative    BENZODIAZEPINE  SCREEN, URINE Negative Negative Negative    BUPRENORPHINE Negative Negative -    COCAINE SCREEN, URINE Negative Negative Negative    METHADONE SCREEN, URINE Negative Negative Negative    METHAMPHETAMINE UR Negative Negative Negative          Results from last 7 days  Lab Units 11/12/17  1845 11/12/17  1835   BLOODCX  No growth at 2 days No growth at 2 days       Pertinent Radiology Results:    Imaging Results (all)     Procedure Component Value Units Date/Time    XR Chest 1 View [[id number]] Collected:  11/13/17 0740     Updated:  11/13/17 0746    Narrative:       PROCEDURE: XR CHEST 1 VW-     HISTORY: shortness of breath     COMPARISON: September 4, 2016.     FINDINGS: Electrodes overlie the chest.The heart is normal in size. The  mediastinum is unremarkable. The lungs are clear. There is no  pneumothorax.  There are no acute osseous abnormalities. Chronic changes  in the left clavicle.       Impression:       No acute cardiopulmonary process.     Continued followup is recommended.     This report was finalized on 11/13/2017 7:44 AM by Tejas Kolb DO.          Condition on Discharge:      Stable.    Code status during the hospital stay:    Prior    Discharge Disposition:    Home or Self Care    Discharge Medications:     Kitty Parker   Home Medication Instructions STELLA:[id number]    Printed on:11/15/17 9225   Medication Information                      albuterol (VENTOLIN HFA) 108 (90 Base) MCG/ACT inhaler  Inhale 2 puffs by mouth Every 4 (Four) Hours As Needed for Wheezing.             beclomethasone (QVAR) 40 MCG/ACT inhaler  Inhale 1 puff 2 (two) times a day.             carvedilol (COREG) 25 MG tablet  Take 25 mg by mouth 2 (two) times a day with meals.             diltiazem (TIAZAC) 360 MG 24 hr capsule  Take 360 mg by mouth daily.             dolutegravir (TIVICAY) 50 MG tablet  Take 50 mg by mouth daily.             doxycycline (MONODOX) 100 MG capsule  Take 1 capsule by mouth 2 (Two) Times a Day for  8 days             emtricitabine-tenofovir (TRUVADA) 200-300 MG per tablet  Take 1 tablet by mouth daily.             folic acid (FOLVITE) 1 MG tablet  Take 1 tablet by mouth Daily.             gabapentin (NEURONTIN) 600 MG tablet  Take 600 mg by mouth 3 (Three) Times a Day.             MethylPREDNISolone (MEDROL, GLENN,) 4 MG tablet  Take by mouth as directed on package instructions.             Multiple Vitamins-Minerals (EQ COMPLETE MULTIVITAMIN-ADULT PO)  Take 1 tablet by mouth every night.             pantoprazole (PROTONIX) 40 MG EC tablet  Take 40 mg by mouth daily.             Prenatal Vit-Fe Fumarate-FA (VOL-PLUS) 27-1 MG tablet  Take 1 tablet by mouth every night. To be taken 6 hours apart from Tivicay             spironolactone (ALDACTONE) 25 MG tablet  Take 25 mg by mouth daily.             sulfamethoxazole-trimethoprim (BACTRIM DS,SEPTRA DS) 800-160 MG per tablet  Take 1 tablet by mouth daily.             thiamine (VITAMIN B-1) 100 MG tablet  Take 1 tablet by mouth Daily for 1 dose.             warfarin (COUMADIN) 4 MG tablet  Take 4 mg by mouth Daily. Patient is taking 4mg daily                 Discharge Diet:     Diet Instructions     Diet: Regular       Discharge Diet:  Regular                 Activity at Discharge:     Activity Instructions     Activity as Tolerated                     Follow-up Appointments:    Additional Instructions for the Follow-ups that You Need to Schedule     Discharge Follow-up with PCP    As directed    Follow Up Details:  2 weeks       Discharge Follow-up with Specified Provider: Dr. Gerard; 3 Weeks    As directed    To:  Dr. Gerard   Follow Up:  3 Weeks   Follow Up Details:  Please call for appointment             Follow-up Information     Follow up with Rosa Tucker MD .    Specialty:  Family Medicine    Why:  FOLLOW UP WITH DR. TUCKER ON NOVEMBER 28TH @ 1:50      Contact information:    [address] [place] [address] [phone]          Follow up with Maryam GUIDO  MD Rajani Follow up in 3 week(s).    Specialties:  Pulmonary Disease, Sleep Medicine    Why:  FOLLOW UP WITH DR. GERARD ON JANUARY 17TH @ 1:45    Contact information:    [address]  [address]  [facility] [address] [phone]            Future Appointments  Date Time Provider Department Center   1/17/2018 1:45 PM Maryam Gerard MD [facility] None       Additional Instructions for the Follow-ups that You Need to Schedule     Discharge Follow-up with PCP    As directed    Follow Up Details:  2 weeks       Discharge Follow-up with Specified Provider: Dr. Gerard; 3 Weeks    As directed    To:  Dr. Gerard   Follow Up:  3 Weeks   Follow Up Details:  Please call for appointment                 Test Results Pending at Discharge:     Order Current Status    Blood Culture With AYSHA - Blood, Preliminary result    Blood Culture With AYSHA - Blood, Preliminary result             Clarisa Mancilla MD  11/15/17  4:57 PM    Time spent: 32 minutes.     Dictated utilizing Dragon dictation.

## 2017-11-15 NOTE — PROGRESS NOTES
Continued Stay Note  LANA Tsang     Patient Name: Kitty Parker  MRN: [id number]  Today's Date: 11/15/2017    Admit Date: 11/12/2017          Discharge Plan       11/15/17 1110    Case Management/Social Work Plan    Additional Comments Per 6 Min walk pt dos not need home oxygen               Discharge Codes     None        Expected Discharge Date and Time     Expected Discharge Date Expected Discharge Time    Nov 15, 2017             Leeanna Bertrand

## 2017-11-15 NOTE — PROGRESS NOTES
Exercise Oximetry    Patient Name:Kitty Parker   MRN: [id number]   Date: 11/15/17             ROOM AIR BASELINE   SpO2% 95   Heart Rate 76   Blood Pressure      EXERCISE ON ROOM AIR SpO2% EXERCISE ON O2 @  LPM SpO2%   1 MINUTE 92 1 MINUTE    2 MINUTES 92 2 MINUTES    3 MINUTES 94 3 MINUTES    4 MINUTES 91 4 MINUTES    5 MINUTES 94 5 MINUTES    6 MINUTES 91 6 MINUTES               Distance Walked  500 Ft Distance Walked   Dyspnea (Dora Scale)  2 Dyspnea (Dora Scale)   Fatigue (Dora Scale)   Fatigue (Dora Scale)   SpO2% Post Exercise  94 SpO2% Post Exercise   HR Post Exercise  69 HR Post Exercise   Time to Recovery  5 Minutes Time to Recovery     Comments: Pt walked hallway at own pace.

## 2017-11-17 LAB
BACTERIA SPEC AEROBE CULT: NORMAL
BACTERIA SPEC AEROBE CULT: NORMAL

## 2018-01-11 ENCOUNTER — TELEPHONE (OUTPATIENT)
Dept: PULMONOLOGY | Facility: CLINIC | Age: 53
End: 2018-01-11

## 2018-01-11 NOTE — TELEPHONE ENCOUNTER
Pt was called to remind him of his apt. Pt mother states he has not gone to his follow up with pcp Dr Tucker's office nor does he want to keep his apt for our office. Per Crystal with Dr Hart office pt will need to schedule with them for a referral to our office. Pt mother states pt also had hand surgery last week.

## 2018-02-26 ENCOUNTER — HOSPITAL ENCOUNTER (OUTPATIENT)
Dept: GENERAL RADIOLOGY | Facility: HOSPITAL | Age: 53
Discharge: HOME | End: 2018-02-26
Admitting: ORTHOPAEDIC SURGERY

## 2018-02-26 ENCOUNTER — APPOINTMENT (OUTPATIENT)
Dept: PREADMISSION TESTING | Facility: HOSPITAL | Age: 53
End: 2018-02-26

## 2018-02-26 VITALS
BODY MASS INDEX: 32.64 KG/M2 | OXYGEN SATURATION: 97 % | HEART RATE: 57 BPM | DIASTOLIC BLOOD PRESSURE: 87 MMHG | SYSTOLIC BLOOD PRESSURE: 131 MMHG | WEIGHT: 228 LBS | HEIGHT: 70 IN

## 2018-02-26 LAB
ANION GAP SERPL CALCULATED.3IONS-SCNC: 14.9 MMOL/L
APTT PPP: 49.9 SECONDS (ref 25–36)
BUN BLD-MCNC: 8 MG/DL (ref 7–20)
BUN/CREAT SERPL: 8 (ref 6.3–21.9)
CALCIUM SPEC-SCNC: 9.5 MG/DL (ref 8.4–10.2)
CHLORIDE SERPL-SCNC: 102 MMOL/L (ref 98–107)
CO2 SERPL-SCNC: 30 MMOL/L (ref 26–30)
CREAT BLD-MCNC: 1 MG/DL (ref 0.6–1.3)
DEPRECATED RDW RBC AUTO: 41.7 FL (ref 37–54)
ERYTHROCYTE [DISTWIDTH] IN BLOOD BY AUTOMATED COUNT: 11.9 % (ref 11.5–14.5)
GFR SERPL CREATININE-BSD FRML MDRD: 78 ML/MIN/1.73
GLUCOSE BLD-MCNC: 104 MG/DL (ref 74–98)
HCT VFR BLD AUTO: 44.7 % (ref 42–52)
HGB BLD-MCNC: 15.6 G/DL (ref 14–18)
INR PPP: 3.54 (ref 0.9–1.1)
MCH RBC QN AUTO: 33.1 PG (ref 27–31)
MCHC RBC AUTO-ENTMCNC: 34.9 G/DL (ref 30–37)
MCV RBC AUTO: 94.7 FL (ref 80–94)
PLATELET # BLD AUTO: 169 10*3/MM3 (ref 130–400)
PMV BLD AUTO: 9.9 FL (ref 6–12)
POTASSIUM BLD-SCNC: 3.9 MMOL/L (ref 3.5–5.1)
PROTHROMBIN TIME: 40.2 SECONDS (ref 9.3–12.1)
RBC # BLD AUTO: 4.72 10*6/MM3 (ref 4.7–6.1)
SODIUM BLD-SCNC: 143 MMOL/L (ref 137–145)
WBC NRBC COR # BLD: 3.79 10*3/MM3 (ref 4.8–10.8)

## 2018-02-26 PROCEDURE — 93005 ELECTROCARDIOGRAM TRACING: CPT | Performed by: ORTHOPAEDIC SURGERY

## 2018-02-26 PROCEDURE — 85610 PROTHROMBIN TIME: CPT | Performed by: ORTHOPAEDIC SURGERY

## 2018-02-26 PROCEDURE — 85027 COMPLETE CBC AUTOMATED: CPT | Performed by: ORTHOPAEDIC SURGERY

## 2018-02-26 PROCEDURE — 85730 THROMBOPLASTIN TIME PARTIAL: CPT | Performed by: ORTHOPAEDIC SURGERY

## 2018-02-26 PROCEDURE — 80048 BASIC METABOLIC PNL TOTAL CA: CPT | Performed by: ORTHOPAEDIC SURGERY

## 2018-02-26 PROCEDURE — 71045 X-RAY EXAM CHEST 1 VIEW: CPT

## 2018-02-26 PROCEDURE — 36415 COLL VENOUS BLD VENIPUNCTURE: CPT

## 2018-03-01 ENCOUNTER — HOSPITAL ENCOUNTER (OUTPATIENT)
Facility: HOSPITAL | Age: 53
Setting detail: HOSPITAL OUTPATIENT SURGERY
Discharge: HOME | End: 2018-03-01
Attending: ORTHOPAEDIC SURGERY | Admitting: ORTHOPAEDIC SURGERY

## 2018-03-01 ENCOUNTER — ANESTHESIA EVENT (OUTPATIENT)
Dept: PERIOP | Facility: HOSPITAL | Age: 53
End: 2018-03-01

## 2018-03-01 ENCOUNTER — ANESTHESIA (OUTPATIENT)
Dept: PERIOP | Facility: HOSPITAL | Age: 53
End: 2018-03-01

## 2018-03-01 VITALS
RESPIRATION RATE: 18 BRPM | TEMPERATURE: 98 F | DIASTOLIC BLOOD PRESSURE: 99 MMHG | SYSTOLIC BLOOD PRESSURE: 133 MMHG | OXYGEN SATURATION: 93 % | HEART RATE: 69 BPM

## 2018-03-01 LAB
APTT PPP: 30.9 SECONDS (ref 25–36)
INR PPP: 1.68 (ref 0.9–1.1)
PROTHROMBIN TIME: 18.6 SECONDS (ref 9.3–12.1)

## 2018-03-01 PROCEDURE — 85730 THROMBOPLASTIN TIME PARTIAL: CPT | Performed by: ORTHOPAEDIC SURGERY

## 2018-03-01 PROCEDURE — 25010000002 HYDROMORPHONE PER 4 MG

## 2018-03-01 PROCEDURE — 25010000003 CEFAZOLIN PER 500 MG: Performed by: ORTHOPAEDIC SURGERY

## 2018-03-01 PROCEDURE — 25010000002 DEXAMETHASONE PER 1 MG: Performed by: NURSE ANESTHETIST, CERTIFIED REGISTERED

## 2018-03-01 PROCEDURE — 85610 PROTHROMBIN TIME: CPT | Performed by: ORTHOPAEDIC SURGERY

## 2018-03-01 PROCEDURE — 25010000002 PROPOFOL 200 MG/20ML EMULSION: Performed by: NURSE ANESTHETIST, CERTIFIED REGISTERED

## 2018-03-01 PROCEDURE — 25010000002 HYDROMORPHONE PER 4 MG: Performed by: NURSE ANESTHETIST, CERTIFIED REGISTERED

## 2018-03-01 PROCEDURE — 25010000002 FENTANYL CITRATE (PF) 100 MCG/2ML SOLUTION: Performed by: NURSE ANESTHETIST, CERTIFIED REGISTERED

## 2018-03-01 PROCEDURE — 25010000002 ONDANSETRON PER 1 MG: Performed by: NURSE ANESTHETIST, CERTIFIED REGISTERED

## 2018-03-01 RX ORDER — MORPHINE SULFATE 2 MG/ML
2 INJECTION, SOLUTION INTRAMUSCULAR; INTRAVENOUS
Status: DISCONTINUED | OUTPATIENT
Start: 2018-03-01 | End: 2018-03-01 | Stop reason: HOSPADM

## 2018-03-01 RX ORDER — DEXAMETHASONE SODIUM PHOSPHATE 4 MG/ML
INJECTION, SOLUTION INTRA-ARTICULAR; INTRALESIONAL; INTRAMUSCULAR; INTRAVENOUS; SOFT TISSUE AS NEEDED
Status: DISCONTINUED | OUTPATIENT
Start: 2018-03-01 | End: 2018-03-01 | Stop reason: SURG

## 2018-03-01 RX ORDER — FENTANYL CITRATE 50 UG/ML
INJECTION, SOLUTION INTRAMUSCULAR; INTRAVENOUS AS NEEDED
Status: DISCONTINUED | OUTPATIENT
Start: 2018-03-01 | End: 2018-03-01 | Stop reason: SURG

## 2018-03-01 RX ORDER — SODIUM CHLORIDE, SODIUM LACTATE, POTASSIUM CHLORIDE, CALCIUM CHLORIDE 600; 310; 30; 20 MG/100ML; MG/100ML; MG/100ML; MG/100ML
1000 INJECTION, SOLUTION INTRAVENOUS CONTINUOUS PRN
Status: DISCONTINUED | OUTPATIENT
Start: 2018-03-01 | End: 2018-03-01 | Stop reason: HOSPADM

## 2018-03-01 RX ORDER — HYDROCODONE BITARTRATE AND ACETAMINOPHEN 7.5; 325 MG/1; MG/1
1 TABLET ORAL ONCE AS NEEDED
Status: DISCONTINUED | OUTPATIENT
Start: 2018-03-01 | End: 2018-03-01 | Stop reason: HOSPADM

## 2018-03-01 RX ORDER — ONDANSETRON 2 MG/ML
4 INJECTION INTRAMUSCULAR; INTRAVENOUS ONCE AS NEEDED
Status: DISCONTINUED | OUTPATIENT
Start: 2018-03-01 | End: 2018-03-01 | Stop reason: HOSPADM

## 2018-03-01 RX ORDER — HYDROCODONE BITARTRATE AND ACETAMINOPHEN 7.5; 325 MG/1; MG/1
1-2 TABLET ORAL EVERY 4 HOURS PRN
Qty: 30 TABLET | Refills: 0 | Status: ON HOLD | OUTPATIENT
Start: 2018-03-01 | End: 2020-08-21

## 2018-03-01 RX ORDER — SODIUM CHLORIDE, SODIUM LACTATE, POTASSIUM CHLORIDE, CALCIUM CHLORIDE 600; 310; 30; 20 MG/100ML; MG/100ML; MG/100ML; MG/100ML
INJECTION, SOLUTION INTRAVENOUS CONTINUOUS PRN
Status: DISCONTINUED | OUTPATIENT
Start: 2018-03-01 | End: 2018-03-01 | Stop reason: SURG

## 2018-03-01 RX ORDER — ONDANSETRON 2 MG/ML
INJECTION INTRAMUSCULAR; INTRAVENOUS AS NEEDED
Status: DISCONTINUED | OUTPATIENT
Start: 2018-03-01 | End: 2018-03-01 | Stop reason: SURG

## 2018-03-01 RX ORDER — MEPERIDINE HYDROCHLORIDE 50 MG/ML
25 INJECTION INTRAMUSCULAR; INTRAVENOUS; SUBCUTANEOUS
Status: DISCONTINUED | OUTPATIENT
Start: 2018-03-01 | End: 2018-03-01 | Stop reason: HOSPADM

## 2018-03-01 RX ORDER — PROPOFOL 10 MG/ML
INJECTION, EMULSION INTRAVENOUS AS NEEDED
Status: DISCONTINUED | OUTPATIENT
Start: 2018-03-01 | End: 2018-03-01 | Stop reason: SURG

## 2018-03-01 RX ADMIN — HYDROMORPHONE HYDROCHLORIDE 0.5 MG: 1 INJECTION, SOLUTION INTRAMUSCULAR; INTRAVENOUS; SUBCUTANEOUS at 12:07

## 2018-03-01 RX ADMIN — DEXAMETHASONE SODIUM PHOSPHATE 4 MG: 4 INJECTION, SOLUTION INTRAMUSCULAR; INTRAVENOUS at 11:22

## 2018-03-01 RX ADMIN — PROPOFOL 200 MG: 10 INJECTION, EMULSION INTRAVENOUS at 11:22

## 2018-03-01 RX ADMIN — SODIUM CHLORIDE, POTASSIUM CHLORIDE, SODIUM LACTATE AND CALCIUM CHLORIDE 1000 ML: 600; 310; 30; 20 INJECTION, SOLUTION INTRAVENOUS at 10:16

## 2018-03-01 RX ADMIN — CEFAZOLIN SODIUM 2 G: 2 SOLUTION INTRAVENOUS at 11:16

## 2018-03-01 RX ADMIN — FENTANYL CITRATE 100 MCG: 50 INJECTION, SOLUTION INTRAMUSCULAR; INTRAVENOUS at 11:22

## 2018-03-01 RX ADMIN — SODIUM CHLORIDE, POTASSIUM CHLORIDE, SODIUM LACTATE AND CALCIUM CHLORIDE: 600; 310; 30; 20 INJECTION, SOLUTION INTRAVENOUS at 11:16

## 2018-03-01 RX ADMIN — HYDROMORPHONE HYDROCHLORIDE 0.5 MG: 1 INJECTION, SOLUTION INTRAMUSCULAR; INTRAVENOUS; SUBCUTANEOUS at 12:22

## 2018-03-01 RX ADMIN — ONDANSETRON 4 MG: 2 INJECTION INTRAMUSCULAR; INTRAVENOUS at 11:22

## 2018-03-01 NOTE — OP NOTE
[address], [address]  [place]  [address] [phone]      OPERATIVE REPORT           PATIENT NAME:   Kitty Parker                            YOB: 1965      PREOP DIAGNOSIS:   Left Carpal Tunnel Syndrome.    POSTOP DIAGNOSIS:  Left Carpal Tunnel Syndrome.    PROCEDURE:     Left Carpal Tunnel Release    SURGEON:    Brendan Clarke MD     OPERATIVE TEAM:   Circulator: Raine Olguin RN; Fannie Ponce RN  Scrub Person: Nicole Darling    ANESTHETIST:   CRNA: Sukumar Fuller CRNA    ANESTHESIA:   General    ESTIM BLOOD LOSS:  0 ml    TOURNIQUET TIME:   15 minutes @ 250 mm Hg    SPECIMENS:     None.    DRAINS:    None.    COMPLICATIONS:     None.    DISPOSITION:     Stable to recovery.    INDICATIONS AND NARRATIVE:   The patient presents for planned elective carpal tunnel release surgery to address the ongoing right wrist and hand condition.  Risks and benefits of the surgery were discussed and an informed consent obtained.  Risks were discussed including, but not limited to anesthesia, infection, nerve/vessel/tendon injury and persistent symptoms or limitations of the wrist and hand.  Goals include pain relief, improved sensation, strength, mobility and potential for improved function of the wrist and hand.      OPERATIVE PROCEDURE:  Antibiotic prophylaxis given.  Surgeon site marking and a time out were performed.  Anesthesia was effective and well tolerated.  The left arm and hand was prepped and draped in the usual sterile fashion.      After sterile prep and drape and with tourniquet, a curved incision was made along the ulna border of the thenar crease of the right hand.  Careful soft tissue dissection and blunt Ragnell retractors were used to protect the soft tissues and expose the transverse carpal tunnel ligament.  While keeping a blunt freer under the ligament, the ligament was released proximally and distally, decompressing the  median nerve and flexor tendons.  These structures could be easily visualized, and were intact within the carpal tunnel.  The tourniquet was taken down and there was excellent hemostasis during the procedure.  The wound was irrigated copiously.  Skin closure consisted of interrupted 3-0 nylon mattress sutures.  A sterile xeroform padded gauze, soft roll and two inch ace wrap forearm based dressing was applied.  Anesthesia was effective and well tolerated.  There were no complications of the procedure.  The patient was transferred in stable condition to the recovery room.

## 2018-03-01 NOTE — PLAN OF CARE
Problem: Perioperative Period (Adult)  Goal: Signs and Symptoms of Listed Potential Problems Will be Absent or Manageable (Perioperative Period)  Outcome: Ongoing (interventions implemented as appropriate)   03/01/18 0954   Perioperative Period   Problems Assessed (Perioperative Period) all   Problems Present (Perioperative Period) none

## 2018-03-01 NOTE — ANESTHESIA PREPROCEDURE EVALUATION
Anesthesia Evaluation     Patient summary reviewed and Nursing notes reviewed   no history of anesthetic complications:  NPO Solid Status: > 8 hours  NPO Liquid Status: > 8 hours           Airway   Mallampati: I  TM distance: >3 FB  Neck ROM: full  no difficulty expected  Dental - normal exam     Pulmonary - normal exam   (+) pneumonia , COPD, asthma,   Cardiovascular - normal exam    (+) hypertension, DVT,       Neuro/Psych- negative ROS  GI/Hepatic/Renal/Endo    (+)   hepatitis, liver disease,     Musculoskeletal (-) negative ROS    Abdominal    Substance History   (+) alcohol use,      OB/GYN negative ob/gyn ROS         Other - negative ROS         Other Comment: hiv                  Anesthesia Plan    ASA 3     general     intravenous induction   Anesthetic plan and risks discussed with patient.

## 2018-03-01 NOTE — PLAN OF CARE
Problem: Perioperative Period (Adult)  Intervention: Promote Pulmonary Hygiene and Secretion Clearance   03/01/18 1145   Activity   Activity Type bedrest   Promote Aggressive Pulmonary Hygiene/Secretion Management   Cough And Deep Breathing done independently per patient   Positioning   Head Of Bed (HOB) Position HOB at 20-30 degrees     Intervention: Monitor/Manage Pain   03/01/18 1145   Manage Acute Burn Pain   Pain Management Interventions pillow support;cold applied;unnecessary movement minimized     Intervention: Prevent/Manage Post-surgical Infection   03/01/18 1145   Safety Interventions   Infection Prevention rest/sleep promoted     Intervention: Prevent Georgie-procedural Injury   03/01/18 1145   Positioning   Positioning semi-Fowlers   Head Of Bed (HOB) Position HOB at 20-30 degrees     Intervention: Prevent/Manage DVT/VTE Risk   03/01/18 1145   Support Surgical/Anesthesia Recovery   Venous Thromboembolism Prevent/Manage plantar flexion performed     Intervention: Monitor/Manage Postoperative Bleeding   03/01/18 1145   Safety Interventions   Bleeding Management dressing monitored;affected area elevated     Intervention: Promote Normothermia   03/01/18 1145   Cardiac Interventions   Warming Thermoregulation Maintenance warm blankets applied;skin exposure time minimized       Goal: Signs and Symptoms of Listed Potential Problems Will be Absent or Manageable (Perioperative Period)  Outcome: Ongoing (interventions implemented as appropriate)   03/01/18 1145   Perioperative Period   Problems Assessed (Perioperative Period) all   Problems Present (Perioperative Period) none

## 2018-03-01 NOTE — DISCHARGE INSTRUCTIONS
Please follow all post op instructions and follow up appointment time from your physician's office included in your discharge packet.  .   Keep the affected extremity elevated above  level of the heart.  Use your ice pack as instructed, do not use continuously.  Use your crutches and or sling as directed  Follow your physicians instructions as previously directed.  No pushing, pulling, tugging,  heavy lifting, or strenuous activity.  No major decision making, driving, or drinking alcoholic beverages for 24 hours. ( due to the medications you have  received)  Always use good hand hygiene/washing techniques.  NO driving while taking pain medications.  To assist you in voiding:  Drink plenty of fluids  Listen to running water while attempting to void.    If you are unable to urinate and you have an uncomfortable urge to void or it has been   6 hours since you were discharged, return to the Emergency Room

## 2018-03-01 NOTE — ANESTHESIA PROCEDURE NOTES
Airway  Urgency: elective    Airway not difficult    General Information and Staff    Patient location during procedure: OR  CRNA: TOÑITO DYE    Indications and Patient Condition  Indications for airway management: airway protection    Preoxygenated: yes  Mask difficulty assessment: 0 - not attempted    Final Airway Details  Final airway type: supraglottic airway      Successful airway: classic  Size 4    Number of attempts at approach: 1    Additional Comments  Airway pressure leak at <20cm/h20

## 2018-03-01 NOTE — ANESTHESIA POSTPROCEDURE EVALUATION
Patient: Kitty Parker    Procedure Summary     Date Anesthesia Start Anesthesia Stop Room / Location    03/01/18 1116 1150 [facility] OR 3 / [facility]       Procedure Diagnosis Surgeon Provider    CARPAL TUNNEL RELEASE LEFT (Left Wrist) No diagnosis on file. MD Sukumar Wise CRNA          Anesthesia Type: general  Last vitals  BP   133/99 (03/01/18 1330)   Temp   98 °F (36.7 °C) (03/01/18 1300)   Pulse   69 (03/01/18 1330)   Resp   18 (03/01/18 1330)     SpO2   93 % (03/01/18 1330)     Post Anesthesia Care and Evaluation    Patient location during evaluation: PACU  Patient participation: complete - patient participated  Level of consciousness: awake  Pain score: 3  Pain management: adequate  Airway patency: patent  Anesthetic complications: No anesthetic complications  PONV Status: controlled  Cardiovascular status: acceptable and stable  Respiratory status: acceptable and face mask  Hydration status: acceptable

## 2019-04-17 ENCOUNTER — HOSPITAL ENCOUNTER (EMERGENCY)
Facility: HOSPITAL | Age: 54
Discharge: HOME | End: 2019-04-17
Attending: EMERGENCY MEDICINE | Admitting: EMERGENCY MEDICINE

## 2019-04-17 ENCOUNTER — APPOINTMENT (OUTPATIENT)
Dept: GENERAL RADIOLOGY | Facility: HOSPITAL | Age: 54
End: 2019-04-17

## 2019-04-17 VITALS
TEMPERATURE: 97.9 F | WEIGHT: 236.8 LBS | OXYGEN SATURATION: 91 % | DIASTOLIC BLOOD PRESSURE: 84 MMHG | HEART RATE: 91 BPM | BODY MASS INDEX: 33.9 KG/M2 | SYSTOLIC BLOOD PRESSURE: 108 MMHG | HEIGHT: 70 IN | RESPIRATION RATE: 18 BRPM

## 2019-04-17 DIAGNOSIS — S22.31XA CLOSED FRACTURE OF ONE RIB OF RIGHT SIDE, INITIAL ENCOUNTER: Primary | ICD-10-CM

## 2019-04-17 PROCEDURE — 99283 EMERGENCY DEPT VISIT LOW MDM: CPT

## 2019-04-17 PROCEDURE — 71101 X-RAY EXAM UNILAT RIBS/CHEST: CPT

## 2019-04-17 RX ORDER — AZITHROMYCIN 250 MG/1
250 TABLET, FILM COATED ORAL DAILY
Status: ON HOLD | COMMUNITY
End: 2020-11-17

## 2019-04-17 RX ORDER — LORATADINE 10 MG/1
CAPSULE, LIQUID FILLED ORAL TAKE AS DIRECTED
COMMUNITY

## 2020-05-14 ENCOUNTER — TRANSCRIBE ORDERS (OUTPATIENT)
Dept: ADMINISTRATIVE | Facility: HOSPITAL | Age: 55
End: 2020-05-14

## 2020-05-14 DIAGNOSIS — M25.561 RIGHT KNEE PAIN, UNSPECIFIED CHRONICITY: Primary | ICD-10-CM

## 2020-05-26 ENCOUNTER — HOSPITAL ENCOUNTER (OUTPATIENT)
Dept: MRI IMAGING | Facility: HOSPITAL | Age: 55
Discharge: HOME | End: 2020-05-26

## 2020-06-05 ENCOUNTER — HOSPITAL ENCOUNTER (OUTPATIENT)
Dept: MRI IMAGING | Facility: HOSPITAL | Age: 55
Discharge: HOME | End: 2020-06-05
Admitting: ORTHOPAEDIC SURGERY

## 2020-06-05 DIAGNOSIS — M25.561 RIGHT KNEE PAIN, UNSPECIFIED CHRONICITY: ICD-10-CM

## 2020-06-05 PROCEDURE — 73721 MRI JNT OF LWR EXTRE W/O DYE: CPT

## 2020-08-18 ENCOUNTER — APPOINTMENT (OUTPATIENT)
Dept: PREADMISSION TESTING | Facility: HOSPITAL | Age: 55
End: 2020-08-18

## 2020-08-19 ENCOUNTER — APPOINTMENT (OUTPATIENT)
Dept: PREADMISSION TESTING | Facility: HOSPITAL | Age: 55
End: 2020-08-19

## 2020-08-19 ENCOUNTER — HOSPITAL ENCOUNTER (OUTPATIENT)
Dept: GENERAL RADIOLOGY | Facility: HOSPITAL | Age: 55
Discharge: HOME | End: 2020-08-19
Admitting: NURSE ANESTHETIST, CERTIFIED REGISTERED

## 2020-08-19 VITALS — HEIGHT: 70 IN | BODY MASS INDEX: 32.84 KG/M2 | WEIGHT: 229.38 LBS

## 2020-08-19 LAB
ANION GAP SERPL CALCULATED.3IONS-SCNC: 9.2 MMOL/L (ref 5–15)
BUN SERPL-MCNC: 10 MG/DL (ref 6–20)
BUN/CREAT SERPL: 8.9 (ref 7–25)
CALCIUM SPEC-SCNC: 9.1 MG/DL (ref 8.6–10.5)
CHLORIDE SERPL-SCNC: 100 MMOL/L (ref 98–107)
CO2 SERPL-SCNC: 25.8 MMOL/L (ref 22–29)
CREAT SERPL-MCNC: 1.12 MG/DL (ref 0.76–1.27)
DEPRECATED RDW RBC AUTO: 45.4 FL (ref 37–54)
ERYTHROCYTE [DISTWIDTH] IN BLOOD BY AUTOMATED COUNT: 12.5 % (ref 12.3–15.4)
GFR SERPL CREATININE-BSD FRML MDRD: 68 ML/MIN/1.73
GLUCOSE SERPL-MCNC: 90 MG/DL (ref 65–99)
HCT VFR BLD AUTO: 43.2 % (ref 37.5–51)
HGB BLD-MCNC: 15.5 G/DL (ref 13–17.7)
INR PPP: 1.82 (ref 0.9–1.1)
MCH RBC QN AUTO: 35.1 PG (ref 26.6–33)
MCHC RBC AUTO-ENTMCNC: 35.9 G/DL (ref 31.5–35.7)
MCV RBC AUTO: 98 FL (ref 79–97)
PLATELET # BLD AUTO: 210 10*3/MM3 (ref 140–450)
PMV BLD AUTO: 9.3 FL (ref 6–12)
POTASSIUM SERPL-SCNC: 4.1 MMOL/L (ref 3.5–5.2)
PROTHROMBIN TIME: 22.1 SECONDS (ref 12–15.1)
RBC # BLD AUTO: 4.41 10*6/MM3 (ref 4.14–5.8)
SODIUM SERPL-SCNC: 135 MMOL/L (ref 136–145)
WBC # BLD AUTO: 4.31 10*3/MM3 (ref 3.4–10.8)

## 2020-08-19 PROCEDURE — U0002 COVID-19 LAB TEST NON-CDC: HCPCS | Performed by: NURSE ANESTHETIST, CERTIFIED REGISTERED

## 2020-08-19 PROCEDURE — 80048 BASIC METABOLIC PNL TOTAL CA: CPT | Performed by: NURSE ANESTHETIST, CERTIFIED REGISTERED

## 2020-08-19 PROCEDURE — 85610 PROTHROMBIN TIME: CPT | Performed by: NURSE ANESTHETIST, CERTIFIED REGISTERED

## 2020-08-19 PROCEDURE — C9803 HOPD COVID-19 SPEC COLLECT: HCPCS

## 2020-08-19 PROCEDURE — U0004 COV-19 TEST NON-CDC HGH THRU: HCPCS | Performed by: NURSE ANESTHETIST, CERTIFIED REGISTERED

## 2020-08-19 PROCEDURE — 93005 ELECTROCARDIOGRAM TRACING: CPT

## 2020-08-19 PROCEDURE — 85027 COMPLETE CBC AUTOMATED: CPT | Performed by: NURSE ANESTHETIST, CERTIFIED REGISTERED

## 2020-08-19 PROCEDURE — 71045 X-RAY EXAM CHEST 1 VIEW: CPT

## 2020-08-19 PROCEDURE — 36415 COLL VENOUS BLD VENIPUNCTURE: CPT

## 2020-08-20 LAB
REF LAB TEST METHOD: NORMAL
SARS-COV-2 RNA RESP QL NAA+PROBE: NOT DETECTED

## 2020-08-21 ENCOUNTER — ANESTHESIA (OUTPATIENT)
Dept: PERIOP | Facility: HOSPITAL | Age: 55
End: 2020-08-21

## 2020-08-21 ENCOUNTER — ANESTHESIA EVENT (OUTPATIENT)
Dept: PERIOP | Facility: HOSPITAL | Age: 55
End: 2020-08-21

## 2020-08-21 ENCOUNTER — HOSPITAL ENCOUNTER (OUTPATIENT)
Facility: HOSPITAL | Age: 55
Setting detail: HOSPITAL OUTPATIENT SURGERY
Discharge: HOME | End: 2020-08-21
Attending: ORTHOPAEDIC SURGERY | Admitting: ORTHOPAEDIC SURGERY

## 2020-08-21 VITALS
DIASTOLIC BLOOD PRESSURE: 78 MMHG | SYSTOLIC BLOOD PRESSURE: 121 MMHG | HEART RATE: 58 BPM | TEMPERATURE: 97.8 F | RESPIRATION RATE: 18 BRPM | OXYGEN SATURATION: 92 %

## 2020-08-21 PROCEDURE — 25010000002 ONDANSETRON PER 1 MG: Performed by: NURSE ANESTHETIST, CERTIFIED REGISTERED

## 2020-08-21 PROCEDURE — 25010000002 DEXAMETHASONE PER 1 MG: Performed by: NURSE ANESTHETIST, CERTIFIED REGISTERED

## 2020-08-21 PROCEDURE — 94799 UNLISTED PULMONARY SVC/PX: CPT

## 2020-08-21 PROCEDURE — 25010000002 MORPHINE PER 10 MG: Performed by: ORTHOPAEDIC SURGERY

## 2020-08-21 PROCEDURE — 25010000002 FENTANYL CITRATE (PF) 100 MCG/2ML SOLUTION: Performed by: NURSE ANESTHETIST, CERTIFIED REGISTERED

## 2020-08-21 PROCEDURE — 25010000002 PROPOFOL 200 MG/20ML EMULSION: Performed by: NURSE ANESTHETIST, CERTIFIED REGISTERED

## 2020-08-21 PROCEDURE — 25010000002 MIDAZOLAM PER 1MG: Performed by: NURSE ANESTHETIST, CERTIFIED REGISTERED

## 2020-08-21 PROCEDURE — 25010000003 CEFAZOLIN SODIUM-DEXTROSE 2-3 GM-%(50ML) RECONSTITUTED SOLUTION: Performed by: ORTHOPAEDIC SURGERY

## 2020-08-21 PROCEDURE — 25010000002 HYDROMORPHONE PER 4 MG: Performed by: NURSE ANESTHETIST, CERTIFIED REGISTERED

## 2020-08-21 RX ORDER — DEXAMETHASONE SODIUM PHOSPHATE 4 MG/ML
INJECTION, SOLUTION INTRA-ARTICULAR; INTRALESIONAL; INTRAMUSCULAR; INTRAVENOUS; SOFT TISSUE AS NEEDED
Status: DISCONTINUED | OUTPATIENT
Start: 2020-08-21 | End: 2020-08-21 | Stop reason: SURG

## 2020-08-21 RX ORDER — PROMETHAZINE HYDROCHLORIDE 25 MG/ML
6.25 INJECTION, SOLUTION INTRAMUSCULAR; INTRAVENOUS ONCE AS NEEDED
Status: DISCONTINUED | OUTPATIENT
Start: 2020-08-21 | End: 2020-08-21 | Stop reason: HOSPADM

## 2020-08-21 RX ORDER — HYDROCODONE BITARTRATE AND ACETAMINOPHEN 7.5; 325 MG/1; MG/1
1-2 TABLET ORAL EVERY 4 HOURS PRN
Qty: 40 TABLET | Refills: 0 | Status: ON HOLD | OUTPATIENT
Start: 2020-08-21 | End: 2020-11-17

## 2020-08-21 RX ORDER — CEFAZOLIN SODIUM 2 G/50ML
2 SOLUTION INTRAVENOUS ONCE
Status: COMPLETED | OUTPATIENT
Start: 2020-08-21 | End: 2020-08-21

## 2020-08-21 RX ORDER — ONDANSETRON 2 MG/ML
INJECTION INTRAMUSCULAR; INTRAVENOUS AS NEEDED
Status: DISCONTINUED | OUTPATIENT
Start: 2020-08-21 | End: 2020-08-21 | Stop reason: SURG

## 2020-08-21 RX ORDER — MAGNESIUM HYDROXIDE 1200 MG/15ML
LIQUID ORAL AS NEEDED
Status: DISCONTINUED | OUTPATIENT
Start: 2020-08-21 | End: 2020-08-21 | Stop reason: HOSPADM

## 2020-08-21 RX ORDER — SODIUM CHLORIDE 0.9 % (FLUSH) 0.9 %
10 SYRINGE (ML) INJECTION AS NEEDED
Status: DISCONTINUED | OUTPATIENT
Start: 2020-08-21 | End: 2020-08-21 | Stop reason: HOSPADM

## 2020-08-21 RX ORDER — ONDANSETRON 2 MG/ML
4 INJECTION INTRAMUSCULAR; INTRAVENOUS ONCE AS NEEDED
Status: DISCONTINUED | OUTPATIENT
Start: 2020-08-21 | End: 2020-08-21 | Stop reason: HOSPADM

## 2020-08-21 RX ORDER — CLINDAMYCIN PHOSPHATE 900 MG/50ML
900 INJECTION, SOLUTION INTRAVENOUS ONCE
Status: DISCONTINUED | OUTPATIENT
Start: 2020-08-21 | End: 2020-08-21 | Stop reason: ALTCHOICE

## 2020-08-21 RX ORDER — PROMETHAZINE HYDROCHLORIDE 25 MG/1
25 TABLET ORAL ONCE AS NEEDED
Status: DISCONTINUED | OUTPATIENT
Start: 2020-08-21 | End: 2020-08-21 | Stop reason: HOSPADM

## 2020-08-21 RX ORDER — PROMETHAZINE HYDROCHLORIDE 25 MG/1
25 SUPPOSITORY RECTAL ONCE AS NEEDED
Status: DISCONTINUED | OUTPATIENT
Start: 2020-08-21 | End: 2020-08-21 | Stop reason: HOSPADM

## 2020-08-21 RX ORDER — IPRATROPIUM BROMIDE AND ALBUTEROL SULFATE 2.5; .5 MG/3ML; MG/3ML
3 SOLUTION RESPIRATORY (INHALATION) ONCE AS NEEDED
Status: DISCONTINUED | OUTPATIENT
Start: 2020-08-21 | End: 2020-08-21 | Stop reason: HOSPADM

## 2020-08-21 RX ORDER — FENTANYL CITRATE 50 UG/ML
INJECTION, SOLUTION INTRAMUSCULAR; INTRAVENOUS AS NEEDED
Status: DISCONTINUED | OUTPATIENT
Start: 2020-08-21 | End: 2020-08-21 | Stop reason: SURG

## 2020-08-21 RX ORDER — LIDOCAINE HYDROCHLORIDE 20 MG/ML
INJECTION, SOLUTION INTRAVENOUS AS NEEDED
Status: DISCONTINUED | OUTPATIENT
Start: 2020-08-21 | End: 2020-08-21 | Stop reason: SURG

## 2020-08-21 RX ORDER — SODIUM CHLORIDE, SODIUM LACTATE, POTASSIUM CHLORIDE, CALCIUM CHLORIDE 600; 310; 30; 20 MG/100ML; MG/100ML; MG/100ML; MG/100ML
1000 INJECTION, SOLUTION INTRAVENOUS CONTINUOUS
Status: DISCONTINUED | OUTPATIENT
Start: 2020-08-21 | End: 2020-08-21 | Stop reason: HOSPADM

## 2020-08-21 RX ORDER — MIDAZOLAM HYDROCHLORIDE 2 MG/2ML
INJECTION, SOLUTION INTRAMUSCULAR; INTRAVENOUS AS NEEDED
Status: DISCONTINUED | OUTPATIENT
Start: 2020-08-21 | End: 2020-08-21 | Stop reason: SURG

## 2020-08-21 RX ORDER — PROPOFOL 10 MG/ML
INJECTION, EMULSION INTRAVENOUS AS NEEDED
Status: DISCONTINUED | OUTPATIENT
Start: 2020-08-21 | End: 2020-08-21 | Stop reason: SURG

## 2020-08-21 RX ORDER — HYDROMORPHONE HCL 110MG/55ML
PATIENT CONTROLLED ANALGESIA SYRINGE INTRAVENOUS AS NEEDED
Status: DISCONTINUED | OUTPATIENT
Start: 2020-08-21 | End: 2020-08-21 | Stop reason: SURG

## 2020-08-21 RX ORDER — MEPERIDINE HYDROCHLORIDE 25 MG/ML
12.5 INJECTION INTRAMUSCULAR; INTRAVENOUS; SUBCUTANEOUS
Status: DISCONTINUED | OUTPATIENT
Start: 2020-08-21 | End: 2020-08-21 | Stop reason: HOSPADM

## 2020-08-21 RX ORDER — BUPIVACAINE HYDROCHLORIDE AND EPINEPHRINE 5; 5 MG/ML; UG/ML
INJECTION, SOLUTION EPIDURAL; INTRACAUDAL; PERINEURAL AS NEEDED
Status: DISCONTINUED | OUTPATIENT
Start: 2020-08-21 | End: 2020-08-21 | Stop reason: HOSPADM

## 2020-08-21 RX ADMIN — FENTANYL CITRATE 50 MCG: 50 INJECTION INTRAMUSCULAR; INTRAVENOUS at 10:20

## 2020-08-21 RX ADMIN — HYDROMORPHONE HYDROCHLORIDE 0.5 MG: 2 INJECTION, SOLUTION INTRAMUSCULAR; INTRAVENOUS; SUBCUTANEOUS at 10:40

## 2020-08-21 RX ADMIN — ONDANSETRON 4 MG: 2 INJECTION INTRAMUSCULAR; INTRAVENOUS at 10:26

## 2020-08-21 RX ADMIN — MIDAZOLAM HYDROCHLORIDE 1 MG: 1 INJECTION, SOLUTION INTRAMUSCULAR; INTRAVENOUS at 10:20

## 2020-08-21 RX ADMIN — DEXAMETHASONE SODIUM PHOSPHATE 4 MG: 4 INJECTION, SOLUTION INTRAMUSCULAR; INTRAVENOUS at 10:26

## 2020-08-21 RX ADMIN — CEFAZOLIN SODIUM 2 G: 2 SOLUTION INTRAVENOUS at 10:26

## 2020-08-21 RX ADMIN — PROPOFOL 200 MG: 10 INJECTION, EMULSION INTRAVENOUS at 10:23

## 2020-08-21 RX ADMIN — SODIUM CHLORIDE, POTASSIUM CHLORIDE, SODIUM LACTATE AND CALCIUM CHLORIDE 1000 ML: 600; 310; 30; 20 INJECTION, SOLUTION INTRAVENOUS at 08:45

## 2020-08-21 RX ADMIN — LIDOCAINE HYDROCHLORIDE 60 MG: 20 INJECTION, SOLUTION INTRAVENOUS at 10:23

## 2020-08-21 NOTE — ANESTHESIA PREPROCEDURE EVALUATION
Anesthesia Evaluation     Patient summary reviewed and Nursing notes reviewed   no history of anesthetic complications:  NPO Solid Status: > 8 hours  NPO Liquid Status: > 8 hours           Airway   Mallampati: I  TM distance: >3 FB  Neck ROM: full  no difficulty expected  Dental - normal exam     Pulmonary - normal exam   (+) pneumonia , COPD, asthma,  Cardiovascular - normal exam    (+) hypertension, DVT,       Neuro/Psych- negative ROS  GI/Hepatic/Renal/Endo    (+)   hepatitis, liver disease,     Musculoskeletal (-) negative ROS    Abdominal    Substance History   (+) alcohol use,      OB/GYN negative ob/gyn ROS         Other - negative ROS         Other Comment: hiv                    Anesthesia Plan    ASA 3     general     intravenous induction     Anesthetic plan, all risks, benefits, and alternatives have been provided, discussed and informed consent has been obtained with: patient.

## 2020-08-21 NOTE — ANESTHESIA POSTPROCEDURE EVALUATION
Patient: Kitty Parker    Procedure Summary     Date:  08/21/20 Room / Location:  [facility] OR 5 /  [facility]    Anesthesia Start:  1018 Anesthesia Stop:  1058    Procedure:  KNEE ARTHROSCOPY RIGHT WITH PARTIAL  MEDIAL MENISCECTOMY  (Right Knee) Diagnosis:       Pain in right knee      (Pain in right knee [M25.561])    Surgeon:  Dank Bethea MD Provider:  Mayela Loco CRNA    Anesthesia Type:  general ASA Status:  3          Anesthesia Type: general    Vitals  Vitals Value Taken Time   /83 8/21/2020 11:59 AM   Temp 98.4 °F (36.9 °C) 8/21/2020 11:00 AM   Pulse 60 8/21/2020 12:00 PM   Resp 12 8/21/2020 11:30 AM   SpO2 72 % 8/21/2020 12:00 PM   Vitals shown include unvalidated device data.        Post Anesthesia Care and Evaluation    Patient location during evaluation: PHASE II  Patient participation: complete - patient participated  Level of consciousness: awake and alert  Pain score: 0  Pain management: satisfactory to patient  Airway patency: patent  Anesthetic complications: No anesthetic complications  PONV Status: none  Cardiovascular status: acceptable and stable  Respiratory status: acceptable  Hydration status: acceptable

## 2020-08-21 NOTE — ANESTHESIA PROCEDURE NOTES
Airway  Urgency: elective    Date/Time: 8/21/2020 10:24 AM    General Information and Staff    Patient location during procedure: OR  CRNA: Mayela Loco CRNA    Indications and Patient Condition    Preoxygenated: yes  Mask difficulty assessment: 1 - vent by mask    Final Airway Details  Final airway type: supraglottic airway      Successful airway: unique  Size 4  Airway Seal Pressure (cm H2O): 20    Number of attempts at approach: 1  Assessment: lips, teeth, and gum same as pre-op    Additional Comments  LMA seats well

## 2020-10-27 ENCOUNTER — TRANSCRIBE ORDERS (OUTPATIENT)
Dept: ULTRASOUND IMAGING | Facility: HOSPITAL | Age: 55
End: 2020-10-27

## 2020-10-27 ENCOUNTER — HOSPITAL ENCOUNTER (OUTPATIENT)
Dept: ULTRASOUND IMAGING | Facility: HOSPITAL | Age: 55
Discharge: HOME | End: 2020-10-27
Admitting: ORTHOPAEDIC SURGERY

## 2020-10-27 DIAGNOSIS — M25.561 RIGHT KNEE PAIN, UNSPECIFIED CHRONICITY: ICD-10-CM

## 2020-10-27 DIAGNOSIS — M25.561 RIGHT KNEE PAIN, UNSPECIFIED CHRONICITY: Primary | ICD-10-CM

## 2020-10-27 DIAGNOSIS — M25.462 SWELLING OF LEFT KNEE JOINT: ICD-10-CM

## 2020-10-27 PROCEDURE — 93971 EXTREMITY STUDY: CPT

## 2020-11-17 ENCOUNTER — APPOINTMENT (OUTPATIENT)
Dept: ULTRASOUND IMAGING | Facility: HOSPITAL | Age: 55
End: 2020-11-17

## 2020-11-17 ENCOUNTER — APPOINTMENT (OUTPATIENT)
Dept: CT IMAGING | Facility: HOSPITAL | Age: 55
End: 2020-11-17

## 2020-11-17 ENCOUNTER — HOSPITAL ENCOUNTER (INPATIENT)
Facility: HOSPITAL | Age: 55
LOS: 1 days | Discharge: HOME | End: 2020-11-18
Attending: EMERGENCY MEDICINE | Admitting: INTERNAL MEDICINE

## 2020-11-17 ENCOUNTER — APPOINTMENT (OUTPATIENT)
Dept: GENERAL RADIOLOGY | Facility: HOSPITAL | Age: 55
End: 2020-11-17

## 2020-11-17 DIAGNOSIS — M79.604 PAIN OF RIGHT LOWER EXTREMITY: ICD-10-CM

## 2020-11-17 DIAGNOSIS — Z78.9 ALCOHOL USE: ICD-10-CM

## 2020-11-17 DIAGNOSIS — I82.411 DEEP VEIN THROMBOSIS (DVT) OF FEMORAL VEIN OF RIGHT LOWER EXTREMITY, UNSPECIFIED CHRONICITY (HCC): Primary | ICD-10-CM

## 2020-11-17 PROBLEM — I87.2 VENOUS INSUFFICIENCY OF BOTH LOWER EXTREMITIES: Chronic | Status: ACTIVE | Noted: 2020-11-17

## 2020-11-17 PROBLEM — I82.401 RECURRENT DEEP VEIN THROMBOSIS (DVT) OF RIGHT LOWER EXTREMITY (HCC): Status: ACTIVE | Noted: 2020-11-17

## 2020-11-17 PROBLEM — I87.2 EDEMA OF RIGHT LOWER LEG DUE TO VENOUS STASIS: Status: ACTIVE | Noted: 2020-11-17

## 2020-11-17 PROBLEM — R60.0 EDEMA OF RIGHT LOWER LEG DUE TO VENOUS STASIS: Status: ACTIVE | Noted: 2020-11-17

## 2020-11-17 PROBLEM — Z21 ASYMPTOMATIC HIV INFECTION (HCC): Chronic | Status: ACTIVE | Noted: 2020-11-17

## 2020-11-17 LAB
ALBUMIN SERPL-MCNC: 3.6 G/DL (ref 3.5–5.2)
ALBUMIN/GLOB SERPL: 1 G/DL
ALP SERPL-CCNC: 90 U/L (ref 39–117)
ALT SERPL W P-5'-P-CCNC: 28 U/L (ref 1–41)
ANION GAP SERPL CALCULATED.3IONS-SCNC: 13.2 MMOL/L (ref 5–15)
APTT PPP: 40.8 SECONDS (ref 70–100)
AST SERPL-CCNC: 34 U/L (ref 1–40)
BASOPHILS # BLD AUTO: 0.06 10*3/MM3 (ref 0–0.2)
BASOPHILS NFR BLD AUTO: 1.2 % (ref 0–1.5)
BILIRUB SERPL-MCNC: 0.3 MG/DL (ref 0–1.2)
BUN SERPL-MCNC: 6 MG/DL (ref 6–20)
BUN/CREAT SERPL: 5.3 (ref 7–25)
CALCIUM SPEC-SCNC: 9.1 MG/DL (ref 8.6–10.5)
CHLORIDE SERPL-SCNC: 96 MMOL/L (ref 98–107)
CO2 SERPL-SCNC: 22.8 MMOL/L (ref 22–29)
CREAT SERPL-MCNC: 1.14 MG/DL (ref 0.76–1.27)
DEPRECATED RDW RBC AUTO: 45.8 FL (ref 37–54)
EOSINOPHIL # BLD AUTO: 0.41 10*3/MM3 (ref 0–0.4)
EOSINOPHIL NFR BLD AUTO: 8.2 % (ref 0.3–6.2)
ERYTHROCYTE [DISTWIDTH] IN BLOOD BY AUTOMATED COUNT: 12.6 % (ref 12.3–15.4)
GFR SERPL CREATININE-BSD FRML MDRD: 66 ML/MIN/1.73
GLOBULIN UR ELPH-MCNC: 3.6 GM/DL
GLUCOSE SERPL-MCNC: 75 MG/DL (ref 65–99)
HCT VFR BLD AUTO: 38.9 % (ref 37.5–51)
HGB BLD-MCNC: 13.3 G/DL (ref 13–17.7)
IMM GRANULOCYTES # BLD AUTO: 0.01 10*3/MM3 (ref 0–0.05)
IMM GRANULOCYTES NFR BLD AUTO: 0.2 % (ref 0–0.5)
INR PPP: 2.09 (ref 0.9–1.1)
INR PPP: 2.25 (ref 0.9–1.1)
LYMPHOCYTES # BLD AUTO: 1.5 10*3/MM3 (ref 0.7–3.1)
LYMPHOCYTES NFR BLD AUTO: 30 % (ref 19.6–45.3)
MCH RBC QN AUTO: 34 PG (ref 26.6–33)
MCHC RBC AUTO-ENTMCNC: 34.2 G/DL (ref 31.5–35.7)
MCV RBC AUTO: 99.5 FL (ref 79–97)
MONOCYTES # BLD AUTO: 0.54 10*3/MM3 (ref 0.1–0.9)
MONOCYTES NFR BLD AUTO: 10.8 % (ref 5–12)
NEUTROPHILS NFR BLD AUTO: 2.48 10*3/MM3 (ref 1.7–7)
NEUTROPHILS NFR BLD AUTO: 49.6 % (ref 42.7–76)
NRBC BLD AUTO-RTO: 0 /100 WBC (ref 0–0.2)
PLATELET # BLD AUTO: 205 10*3/MM3 (ref 140–450)
PMV BLD AUTO: 9.3 FL (ref 6–12)
POTASSIUM SERPL-SCNC: 3.7 MMOL/L (ref 3.5–5.2)
PROT SERPL-MCNC: 7.2 G/DL (ref 6–8.5)
PROTHROMBIN TIME: 24.8 SECONDS (ref 12–15.1)
PROTHROMBIN TIME: 26.3 SECONDS (ref 12–15.1)
RBC # BLD AUTO: 3.91 10*6/MM3 (ref 4.14–5.8)
SODIUM SERPL-SCNC: 132 MMOL/L (ref 136–145)
TROPONIN T SERPL-MCNC: <0.01 NG/ML (ref 0–0.03)
WBC # BLD AUTO: 5 10*3/MM3 (ref 3.4–10.8)

## 2020-11-17 PROCEDURE — 25010000002 IOPAMIDOL 61 % SOLUTION: Performed by: PHYSICIAN ASSISTANT

## 2020-11-17 PROCEDURE — 80053 COMPREHEN METABOLIC PANEL: CPT | Performed by: PHYSICIAN ASSISTANT

## 2020-11-17 PROCEDURE — 85730 THROMBOPLASTIN TIME PARTIAL: CPT | Performed by: PHYSICIAN ASSISTANT

## 2020-11-17 PROCEDURE — 25010000002 HEPARIN (PORCINE) PER 1000 UNITS: Performed by: PHYSICIAN ASSISTANT

## 2020-11-17 PROCEDURE — 94640 AIRWAY INHALATION TREATMENT: CPT

## 2020-11-17 PROCEDURE — 94799 UNLISTED PULMONARY SVC/PX: CPT

## 2020-11-17 PROCEDURE — 85025 COMPLETE CBC W/AUTO DIFF WBC: CPT | Performed by: PHYSICIAN ASSISTANT

## 2020-11-17 PROCEDURE — 93005 ELECTROCARDIOGRAM TRACING: CPT | Performed by: PHYSICIAN ASSISTANT

## 2020-11-17 PROCEDURE — 84484 ASSAY OF TROPONIN QUANT: CPT | Performed by: PHYSICIAN ASSISTANT

## 2020-11-17 PROCEDURE — 99223 1ST HOSP IP/OBS HIGH 75: CPT | Performed by: FAMILY MEDICINE

## 2020-11-17 PROCEDURE — 71275 CT ANGIOGRAPHY CHEST: CPT

## 2020-11-17 PROCEDURE — 73562 X-RAY EXAM OF KNEE 3: CPT

## 2020-11-17 PROCEDURE — 93971 EXTREMITY STUDY: CPT

## 2020-11-17 PROCEDURE — 85610 PROTHROMBIN TIME: CPT | Performed by: PHYSICIAN ASSISTANT

## 2020-11-17 PROCEDURE — 99284 EMERGENCY DEPT VISIT MOD MDM: CPT

## 2020-11-17 PROCEDURE — 25010000002 FUROSEMIDE PER 20 MG: Performed by: FAMILY MEDICINE

## 2020-11-17 RX ORDER — ONDANSETRON 2 MG/ML
4 INJECTION INTRAMUSCULAR; INTRAVENOUS EVERY 6 HOURS PRN
Status: DISCONTINUED | OUTPATIENT
Start: 2020-11-17 | End: 2020-11-18 | Stop reason: HOSPADM

## 2020-11-17 RX ORDER — MULTIPLE VITAMINS W/ MINERALS TAB 9MG-400MCG
1 TAB ORAL NIGHTLY
Status: DISCONTINUED | OUTPATIENT
Start: 2020-11-17 | End: 2020-11-18 | Stop reason: SDUPTHER

## 2020-11-17 RX ORDER — ALBUTEROL SULFATE 2.5 MG/3ML
2.5 SOLUTION RESPIRATORY (INHALATION) EVERY 6 HOURS PRN
Status: DISCONTINUED | OUTPATIENT
Start: 2020-11-17 | End: 2020-11-18 | Stop reason: HOSPADM

## 2020-11-17 RX ORDER — BUDESONIDE 0.5 MG/2ML
0.5 INHALANT ORAL
Status: DISCONTINUED | OUTPATIENT
Start: 2020-11-17 | End: 2020-11-18 | Stop reason: HOSPADM

## 2020-11-17 RX ORDER — SODIUM CHLORIDE 0.9 % (FLUSH) 0.9 %
10 SYRINGE (ML) INJECTION EVERY 12 HOURS SCHEDULED
Status: DISCONTINUED | OUTPATIENT
Start: 2020-11-17 | End: 2020-11-18 | Stop reason: HOSPADM

## 2020-11-17 RX ORDER — CHOLECALCIFEROL (VITAMIN D3) 125 MCG
1000 CAPSULE ORAL DAILY
Status: DISCONTINUED | OUTPATIENT
Start: 2020-11-18 | End: 2020-11-18 | Stop reason: HOSPADM

## 2020-11-17 RX ORDER — POTASSIUM CHLORIDE 750 MG/1
10 CAPSULE, EXTENDED RELEASE ORAL DAILY
Status: DISCONTINUED | OUTPATIENT
Start: 2020-11-18 | End: 2020-11-18 | Stop reason: HOSPADM

## 2020-11-17 RX ORDER — TRAMADOL HYDROCHLORIDE 50 MG/1
50 TABLET ORAL EVERY 6 HOURS PRN
Status: DISCONTINUED | OUTPATIENT
Start: 2020-11-17 | End: 2020-11-18 | Stop reason: HOSPADM

## 2020-11-17 RX ORDER — SODIUM CHLORIDE 0.9 % (FLUSH) 0.9 %
10 SYRINGE (ML) INJECTION AS NEEDED
Status: DISCONTINUED | OUTPATIENT
Start: 2020-11-17 | End: 2020-11-18 | Stop reason: HOSPADM

## 2020-11-17 RX ORDER — HEPARIN SODIUM 1000 [USP'U]/ML
80 INJECTION, SOLUTION INTRAVENOUS; SUBCUTANEOUS ONCE
Status: COMPLETED | OUTPATIENT
Start: 2020-11-17 | End: 2020-11-17

## 2020-11-17 RX ORDER — CETIRIZINE HYDROCHLORIDE 10 MG/1
10 TABLET ORAL DAILY
Status: DISCONTINUED | OUTPATIENT
Start: 2020-11-18 | End: 2020-11-18 | Stop reason: HOSPADM

## 2020-11-17 RX ORDER — ONDANSETRON 4 MG/1
4 TABLET, FILM COATED ORAL EVERY 6 HOURS PRN
Status: DISCONTINUED | OUTPATIENT
Start: 2020-11-17 | End: 2020-11-18 | Stop reason: HOSPADM

## 2020-11-17 RX ORDER — FUROSEMIDE 10 MG/ML
20 INJECTION INTRAMUSCULAR; INTRAVENOUS EVERY 12 HOURS
Status: DISCONTINUED | OUTPATIENT
Start: 2020-11-17 | End: 2020-11-18 | Stop reason: HOSPADM

## 2020-11-17 RX ORDER — LANOLIN ALCOHOL/MO/W.PET/CERES
1000 CREAM (GRAM) TOPICAL DAILY
COMMUNITY

## 2020-11-17 RX ORDER — HEPARIN SODIUM 10000 [USP'U]/100ML
1500 INJECTION, SOLUTION INTRAVENOUS
Status: DISCONTINUED | OUTPATIENT
Start: 2020-11-17 | End: 2020-11-18

## 2020-11-17 RX ORDER — SODIUM CHLORIDE 9 MG/ML
30 INJECTION, SOLUTION INTRAVENOUS CONTINUOUS
Status: DISCONTINUED | OUTPATIENT
Start: 2020-11-17 | End: 2020-11-18

## 2020-11-17 RX ORDER — CARVEDILOL 25 MG/1
25 TABLET ORAL 2 TIMES DAILY WITH MEALS
Status: DISCONTINUED | OUTPATIENT
Start: 2020-11-18 | End: 2020-11-18 | Stop reason: HOSPADM

## 2020-11-17 RX ADMIN — FUROSEMIDE 20 MG: 10 INJECTION, SOLUTION INTRAMUSCULAR; INTRAVENOUS at 22:19

## 2020-11-17 RX ADMIN — HEPARIN SODIUM AND DEXTROSE 1500 UNITS/HR: 10000; 5 INJECTION INTRAVENOUS at 18:55

## 2020-11-17 RX ADMIN — SODIUM CHLORIDE 30 ML/HR: 9 INJECTION, SOLUTION INTRAVENOUS at 22:19

## 2020-11-17 RX ADMIN — IOPAMIDOL 100 ML: 612 INJECTION, SOLUTION INTRAVENOUS at 19:14

## 2020-11-17 RX ADMIN — HEPARIN SODIUM 8160 UNITS: 1000 INJECTION INTRAVENOUS; SUBCUTANEOUS at 18:54

## 2020-11-17 RX ADMIN — SODIUM CHLORIDE, PRESERVATIVE FREE 10 ML: 5 INJECTION INTRAVENOUS at 22:19

## 2020-11-17 RX ADMIN — BUDESONIDE 0.5 MG: 0.5 INHALANT RESPIRATORY (INHALATION) at 22:42

## 2020-11-17 RX ADMIN — MULTIPLE VITAMINS W/ MINERALS TAB 1 TABLET: TAB at 22:19

## 2020-11-17 NOTE — ED PROVIDER NOTES
"Subjective   Patient is a 55-year-old male with a history of alcoholism, anxiety, arthritis, skin cancer, chronic pain disorder, DVT, COPD, HIV, hypertension, and liver disease presenting to the ER for evaluation of leg pain and swelling.  Patient states he had a knee surgery on the right knee a few months ago, per chart review it was arthroscopy and meniscectomy.  He states that since then he has had trouble with swelling in the lower extremity.  He states is also been painful with ambulation.  He has had DVTs in the past, states he is on 3-4 Coumadin per day, states his INR last month was within normal limits.  He states he has been following with his primary care provider and they have been trying to evaluate this outpatient because he is scared of COVID-19.  He states he is been on Bactrim and \"some other antibiotic\" without much relief.  He states that the orthopedic doctors states that this is not from his knee surgery.  He denies any fever, chills, chest pain, shortness of breath, numbness or tingling of the lower extremity, or any other symptoms.           Review of Systems   Constitutional: Negative for chills and fever.   HENT: Negative.    Eyes: Negative.    Respiratory: Negative.    Cardiovascular: Positive for leg swelling. Negative for chest pain.   Gastrointestinal: Negative.    Genitourinary: Negative.    Musculoskeletal: Positive for myalgias.   Skin: Positive for color change.   Allergic/Immunologic: Negative for immunocompromised state.   Neurological: Negative.    Psychiatric/Behavioral: Negative.        Past Medical History:   Diagnosis Date   • Alcohol abuse    • Alcoholism (CMS/[facility])    • Anxiety    • Arthritis     knees   • Asthma    • Cancer (CMS/[facility])     skin cancer   • Chipped tooth     several \"bad\" teeth   • Chronic pain disorder    • COPD (chronic obstructive pulmonary disease) (CMS/[facility])    • DVT (deep venous thrombosis) (CMS/[facility])     dvt in right leg x 2   • HIV disease (CMS/[facility])     " "was dx in 2011   • Hypertension    • Liver disease     elevated liver enzymes per pt   • Lower back pain    • Lumbar herniated disc    • Withdrawal symptoms, alcohol (CMS/HCC)        Allergies   Allergen Reactions   • Hydralazine Cough     SORE THROAT       Past Surgical History:   Procedure Laterality Date   • CARPAL TUNNEL RELEASE Right    • CARPAL TUNNEL RELEASE Left 3/1/2018    Procedure: CARPAL TUNNEL RELEASE LEFT;  Surgeon: Brendan Clarke MD;  Location: [facility] OR;  Service:    • KNEE ARTHROSCOPY Right 8/21/2020    Procedure: KNEE ARTHROSCOPY RIGHT WITH PARTIAL  MEDIAL MENISCECTOMY ;  Surgeon: Dank Bethea MD;  Location: [facility] OR;  Service: Orthopedics;  Laterality: Right;   • SKIN CANCER EXCISION         Family History   Problem Relation Age of Onset   • Dementia Mother    • Alcohol abuse Father    • Dementia Father    • Alcohol abuse Maternal Grandfather    • Alcohol abuse Maternal Grandmother        Social History     Socioeconomic History   • Marital status:      Spouse name: Not on file   • Number of children: Not on file   • Years of education: Not on file   • Highest education level: Not on file   Tobacco Use   • Smoking status: Never Smoker   • Smokeless tobacco: Never Used   • Tobacco comment: was around 2nd hand smoke most of his life    Substance and Sexual Activity   • Alcohol use: Yes     Alcohol/week: 12.0 standard drinks     Types: 12 Cans of beer per week     Comment: 16 OZ BEERS PER DAY/ 12 PACK/6 pck 4-5 xs per week   • Drug use: Not Currently     Types: Marijuana   • Sexual activity: Defer           Objective   Physical Exam  Vitals signs and nursing note reviewed.     /85 (BP Location: Left arm, Patient Position: Lying)   Pulse 59   Temp 98.1 °F (36.7 °C) (Oral)   Resp 20   Ht 177.8 cm (70\")   Wt 102 kg (225 lb)   SpO2 92%   BMI 32.28 kg/m²     GEN: No acute distress, sitting upright in stretcher.  Awake and alert.  Does not appear toxic.  Head: Normocephalic, " atraumatic  Eyes: EOM intact  ENT: Mask in place per protocol  Cardiovascular: Regular rate and rhythm   Lungs: Clear to auscultation bilaterally  Abdomen: Nondistended, no guarding  Extremities: Patient does have edema to the distal right leg that is worse than the left leg.  There is a dusky appearance to the right leg around the calf.  There is mild edema of the knee but no erythema, no tenderness to palpation.  He has full range of motion at the knee.  Sensation is intact.  Capillary refill less than 2 seconds.  Posterior tibialis and dorsalis pedis pulses are 2+ and equal.  There are some very mild pitting edema over the right calf, warm to touch.  Neuro: GCS 15  Psych: Mood and affect are appropriate    Procedures           ED Course  ED Course as of Nov 17 2117 Tue Nov 17, 2020 1754 WBC: 5.00 [LA]   1754 Hemoglobin: 13.3 [LA]   1754 Neutrophil Rel %: 49.6 [LA]   1754 Platelets: 205 [LA]   1820 INR(!): 2.09 [LA]   1821 Protime(!): 24.8 [LA]   1835 RN informed me that patient's oxygen had dropped and he was placed on nasal cannula.    [LA]   1846 Dr Richards discussed with Dr. Miner who is on call tomorrow.  He advised we heparinize, hold Coumadin on admission and he will consult in the morning    [LA]   1854 Updated patient and he is in agreement with this plan of care.  He states he does drink approximately 6 beers per day, is never had any kind of withdrawal symptoms, denies any tremors or other symptoms.  Declined pain medication at this time    [LA]   1914 Glucose: 75 [LA]   1914 BUN: 6 [LA]   1914 Creatinine: 1.14 [LA]   1914 Sodium(!): 132 [LA]   1914 Potassium: 3.7 [LA]   1914 Chloride(!): 96 [LA]   1914 CO2: 22.8 [LA]   1914 Calcium: 9.1 [LA]   1914 Total Protein: 7.2 [LA]   1914 Albumin: 3.60 [LA]   1914 ALT (SGPT): 28 [LA]   1914 AST (SGOT): 34 [LA]   1914 Alkaline Phosphatase: 90 [LA]   1914 Total Bilirubin: 0.3 [LA]   1914 eGFR Non  Am: 66 [LA]   1914 Globulin: 3.6 [LA]   1914 A/G Ratio: 1.0  [LA]   1914 Anion Gap: 13.2 [LA]   1914 Troponin T: <0.010 [LA]   1914 PTT(!): 40.8 [LA]   1915 FINDINGS:  There is incomplete compressibility in the right common femoral  vein secondary to nonocclusive thrombus.  Otherwise, normal  flow, compressibility and augmentation is seen in the remaining  veins.     IMPRESSION:  Noncompressive thrombus of the right common femoral vein.     Authenticated by Casey Jewell M.D. on 11/17/2020 06:36:39 PM    [LA]   1915 EKG interpreted by me reveals sinus rhythm with rate of 54 bpm.  Some low voltage but no obvious acute ST segment or T wave changes only some nonspecific findings.  This is an atypical appearing EKG.    [TB]   1936 INR(!): 2.25 [LA]   1936 Protime(!): 26.3 [LA]   1951 CT of the chest as read by the radiologist was unremarkable.    [LA]   1955 Spoke with Dr. Damian who graciously accepted the patient for admission.    [LA]      ED Course User Index  [LA] Alice Villanueva PA-C  [TB] Savannah Gallegos MD                                           MDM  Number of Diagnoses or Management Options  Alcohol use:   Deep vein thrombosis (DVT) of femoral vein of right lower extremity, unspecified chronicity (CMS/HCC):   Pain of right lower extremity:   Diagnosis management comments: On arrival, patient is stable, no acute distress, nontoxic appearance.  Have low concern for an arterial occlusion given patient has strong palpable distal pulses. differential could include DVT, venous insufficiency, cellulitis, and other concerns.  I have lower concern for any kind of septic joint given patient has full range of motion of the knee. Will obtain basic labs, INR, x-ray of the knee, venous duplex of the right lower extremity as well.  Did discuss with Dr. Richards as well.    CBC was stable without any significant leukocytosis, initial INR was 2.09.  X-rays reviewed with the attending, did not see any significant bony abnormality.  Ultrasound revealed a nonocclusive noncompressible  thrombus in the right common femoral vein.  Believe the erythema and edema is all from this clot, I have low concern for any kind of infection.  The nurse did inform me that patient dropped to 88% on room air while he was asleep and they placed him on nasal cannula.  Given his DVT, will order CTA of the chest as well.  Dr. Richards did contact Dr. Miner who will be on-call for vascular surgery in the morning and he advised that we heparinized the patient, hold Coumadin and he can evaluate in the morning. Spoke with Dr. Damian who graciously accepted the patient for admission.        Amount and/or Complexity of Data Reviewed  Clinical lab tests: reviewed and ordered  Tests in the radiology section of CPT®: reviewed and ordered  Discussion of test results with the performing providers: yes  Review and summarize past medical records: yes  Discuss the patient with other providers: yes    Risk of Complications, Morbidity, and/or Mortality  Presenting problems: moderate  Diagnostic procedures: moderate  Management options: moderate    Patient Progress  Patient progress: stable      Final diagnoses:   Deep vein thrombosis (DVT) of femoral vein of right lower extremity, unspecified chronicity (CMS/HCC)   Pain of right lower extremity   Alcohol use            Alice Villanueva PA-C  11/17/20 9974

## 2020-11-18 VITALS
OXYGEN SATURATION: 91 % | HEART RATE: 53 BPM | TEMPERATURE: 98.2 F | WEIGHT: 225 LBS | BODY MASS INDEX: 32.21 KG/M2 | RESPIRATION RATE: 16 BRPM | SYSTOLIC BLOOD PRESSURE: 150 MMHG | DIASTOLIC BLOOD PRESSURE: 91 MMHG | HEIGHT: 70 IN

## 2020-11-18 LAB
ALBUMIN SERPL-MCNC: 3.8 G/DL (ref 3.5–5.2)
ALBUMIN/GLOB SERPL: 1 G/DL
ALP SERPL-CCNC: 92 U/L (ref 39–117)
ALT SERPL W P-5'-P-CCNC: 27 U/L (ref 1–41)
ANION GAP SERPL CALCULATED.3IONS-SCNC: 9.5 MMOL/L (ref 5–15)
APTT PPP: 72 SECONDS (ref 70–100)
APTT PPP: >200 SECONDS (ref 70–100)
AST SERPL-CCNC: 37 U/L (ref 1–40)
BASOPHILS # BLD AUTO: 0.05 10*3/MM3 (ref 0–0.2)
BASOPHILS NFR BLD AUTO: 1.3 % (ref 0–1.5)
BILIRUB SERPL-MCNC: 0.4 MG/DL (ref 0–1.2)
BUN SERPL-MCNC: 7 MG/DL (ref 6–20)
BUN/CREAT SERPL: 5.8 (ref 7–25)
CALCIUM SPEC-SCNC: 9.2 MG/DL (ref 8.6–10.5)
CHLORIDE SERPL-SCNC: 103 MMOL/L (ref 98–107)
CO2 SERPL-SCNC: 28.5 MMOL/L (ref 22–29)
CREAT SERPL-MCNC: 1.2 MG/DL (ref 0.76–1.27)
DEPRECATED RDW RBC AUTO: 46.6 FL (ref 37–54)
EOSINOPHIL # BLD AUTO: 0.24 10*3/MM3 (ref 0–0.4)
EOSINOPHIL NFR BLD AUTO: 6.1 % (ref 0.3–6.2)
ERYTHROCYTE [DISTWIDTH] IN BLOOD BY AUTOMATED COUNT: 12.6 % (ref 12.3–15.4)
GFR SERPL CREATININE-BSD FRML MDRD: 63 ML/MIN/1.73
GLOBULIN UR ELPH-MCNC: 3.7 GM/DL
GLUCOSE SERPL-MCNC: 105 MG/DL (ref 65–99)
HCT VFR BLD AUTO: 41.4 % (ref 37.5–51)
HGB BLD-MCNC: 14.2 G/DL (ref 13–17.7)
IMM GRANULOCYTES # BLD AUTO: 0.01 10*3/MM3 (ref 0–0.05)
IMM GRANULOCYTES NFR BLD AUTO: 0.3 % (ref 0–0.5)
INR PPP: 1.9 (ref 0.9–1.1)
LYMPHOCYTES # BLD AUTO: 1.03 10*3/MM3 (ref 0.7–3.1)
LYMPHOCYTES NFR BLD AUTO: 26.3 % (ref 19.6–45.3)
MCH RBC QN AUTO: 34 PG (ref 26.6–33)
MCHC RBC AUTO-ENTMCNC: 34.3 G/DL (ref 31.5–35.7)
MCV RBC AUTO: 99 FL (ref 79–97)
MONOCYTES # BLD AUTO: 0.49 10*3/MM3 (ref 0.1–0.9)
MONOCYTES NFR BLD AUTO: 12.5 % (ref 5–12)
NEUTROPHILS NFR BLD AUTO: 2.1 10*3/MM3 (ref 1.7–7)
NEUTROPHILS NFR BLD AUTO: 53.5 % (ref 42.7–76)
NRBC BLD AUTO-RTO: 0 /100 WBC (ref 0–0.2)
PLATELET # BLD AUTO: 201 10*3/MM3 (ref 140–450)
PMV BLD AUTO: 9.4 FL (ref 6–12)
POTASSIUM SERPL-SCNC: 3.9 MMOL/L (ref 3.5–5.2)
PROT SERPL-MCNC: 7.5 G/DL (ref 6–8.5)
PROTHROMBIN TIME: 22.9 SECONDS (ref 12–15.1)
RBC # BLD AUTO: 4.18 10*6/MM3 (ref 4.14–5.8)
SODIUM SERPL-SCNC: 141 MMOL/L (ref 136–145)
WBC # BLD AUTO: 3.92 10*3/MM3 (ref 3.4–10.8)

## 2020-11-18 PROCEDURE — 99238 HOSP IP/OBS DSCHRG MGMT 30/<: CPT | Performed by: INTERNAL MEDICINE

## 2020-11-18 PROCEDURE — 25010000002 FUROSEMIDE PER 20 MG: Performed by: FAMILY MEDICINE

## 2020-11-18 PROCEDURE — 99254 IP/OBS CNSLTJ NEW/EST MOD 60: CPT | Performed by: INTERNAL MEDICINE

## 2020-11-18 PROCEDURE — 85610 PROTHROMBIN TIME: CPT | Performed by: FAMILY MEDICINE

## 2020-11-18 PROCEDURE — 85025 COMPLETE CBC W/AUTO DIFF WBC: CPT | Performed by: FAMILY MEDICINE

## 2020-11-18 PROCEDURE — 80053 COMPREHEN METABOLIC PANEL: CPT | Performed by: FAMILY MEDICINE

## 2020-11-18 PROCEDURE — 85730 THROMBOPLASTIN TIME PARTIAL: CPT | Performed by: FAMILY MEDICINE

## 2020-11-18 PROCEDURE — 94799 UNLISTED PULMONARY SVC/PX: CPT

## 2020-11-18 RX ORDER — LORAZEPAM 0.5 MG/1
1 TABLET ORAL
Status: DISCONTINUED | OUTPATIENT
Start: 2020-11-18 | End: 2020-11-18 | Stop reason: HOSPADM

## 2020-11-18 RX ORDER — LORAZEPAM 0.5 MG/1
1 TABLET ORAL EVERY 8 HOURS
Status: DISCONTINUED | OUTPATIENT
Start: 2020-11-19 | End: 2020-11-18 | Stop reason: HOSPADM

## 2020-11-18 RX ORDER — LORAZEPAM 2 MG/ML
2 INJECTION INTRAMUSCULAR
Status: DISCONTINUED | OUTPATIENT
Start: 2020-11-18 | End: 2020-11-18 | Stop reason: HOSPADM

## 2020-11-18 RX ORDER — THIAMINE MONONITRATE (VIT B1) 100 MG
100 TABLET ORAL DAILY
Status: DISCONTINUED | OUTPATIENT
Start: 2020-11-19 | End: 2020-11-18 | Stop reason: HOSPADM

## 2020-11-18 RX ORDER — FOLIC ACID 1 MG/1
1 TABLET ORAL DAILY
Status: DISCONTINUED | OUTPATIENT
Start: 2020-11-19 | End: 2020-11-18 | Stop reason: HOSPADM

## 2020-11-18 RX ORDER — LORAZEPAM 2 MG/ML
1 INJECTION INTRAMUSCULAR
Status: DISCONTINUED | OUTPATIENT
Start: 2020-11-18 | End: 2020-11-18 | Stop reason: HOSPADM

## 2020-11-18 RX ORDER — LORAZEPAM 0.5 MG/1
2 TABLET ORAL
Status: DISCONTINUED | OUTPATIENT
Start: 2020-11-18 | End: 2020-11-18 | Stop reason: HOSPADM

## 2020-11-18 RX ORDER — LORAZEPAM 0.5 MG/1
1 TABLET ORAL EVERY 6 HOURS
Status: DISCONTINUED | OUTPATIENT
Start: 2020-11-18 | End: 2020-11-18 | Stop reason: HOSPADM

## 2020-11-18 RX ORDER — MULTIPLE VITAMINS W/ MINERALS TAB 9MG-400MCG
1 TAB ORAL DAILY
Status: DISCONTINUED | OUTPATIENT
Start: 2020-11-19 | End: 2020-11-18 | Stop reason: HOSPADM

## 2020-11-18 RX ADMIN — CARVEDILOL 25 MG: 25 TABLET, FILM COATED ORAL at 08:39

## 2020-11-18 RX ADMIN — EMTRICITABINE AND TENOFOVIR ALAFENAMIDE 1 TABLET: 200; 25 TABLET ORAL at 09:53

## 2020-11-18 RX ADMIN — CYANOCOBALAMIN TAB 500 MCG 1000 MCG: 500 TAB at 08:39

## 2020-11-18 RX ADMIN — CETIRIZINE HYDROCHLORIDE 10 MG: 10 TABLET, FILM COATED ORAL at 08:39

## 2020-11-18 RX ADMIN — LORAZEPAM 1 MG: 0.5 TABLET ORAL at 10:34

## 2020-11-18 RX ADMIN — DOLUTEGRAVIR SODIUM 50 MG: 50 TABLET, FILM COATED ORAL at 09:53

## 2020-11-18 RX ADMIN — POTASSIUM CHLORIDE 10 MEQ: 10 CAPSULE, COATED, EXTENDED RELEASE ORAL at 08:40

## 2020-11-18 RX ADMIN — FUROSEMIDE 20 MG: 10 INJECTION, SOLUTION INTRAMUSCULAR; INTRAVENOUS at 10:34

## 2020-11-18 RX ADMIN — APIXABAN 5 MG: 5 TABLET, FILM COATED ORAL at 12:47

## 2020-11-18 NOTE — PLAN OF CARE
Goal Outcome Evaluation:  Plan of Care Reviewed With: patient  Progress: no change  Outcome Summary: New admit overnight. Continue to monitor heparin drip.

## 2020-11-18 NOTE — CONSULTS
[facility] Consult Note    Kitty Parker  1965  [id number]  11/18/20    Requesting Physician: Arsenio Damian DO    Chief Complaint: Right lower extremity swelling    History of Present Illness:   Mr. Kitty Parker is a 55 y.o. male who is being seen by Cardiology for evaluation of right lower extremity swelling.  The patient has a past medical history significant for hypertension, chronic EtOH use with approximately 16 beers per day use, HIV, and marijuana use.  His recent medical history is significant for right knee meniscectomy performed in 8/2020.  The patient also has a past medical history difficult for recurrent right lower extremity DVTs.  He reports his initial DVT occurred in approximately 2008.  At that time, he does report a right ankle injury requiring immobilization.  He was subsequent placed on Coumadin therapy.  Since that time, he has had 2 subsequent DVTs.  At one point, he did have a Reece filter, which was reportedly removed due to filter infection.  The patient reports an approximately 2 to 3-month history of right lower extremity swelling above baseline.  The symptoms occurred shortly after undergoing right knee surgery.  Given persistent right lower extremity swelling and discomfort, he presented to the emergency department for evaluation.  An initial laboratory evaluation revealed a therapeutic INR of 2.09.  A CT of the chest was obtained, which did not show any evidence of pulmonary embolism.  A lower extremity venous duplex, however, was consistent with a common femoral DVT.  The patient was subsequently started on heparin, and his Coumadin was held.  Cardiology has been consulted for further recommendations and management.    Review of Systems:   Review of Systems   Constitutional: Negative for activity change, appetite change, chills, diaphoresis, fatigue, fever, unexpected weight gain and unexpected weight loss.   Eyes: Negative for blurred  "vision and double vision.   Respiratory: Negative for cough, chest tightness, shortness of breath and wheezing.    Cardiovascular: Positive for leg swelling. Negative for chest pain and palpitations.   Gastrointestinal: Negative for abdominal pain, anal bleeding, blood in stool and GERD.   Endocrine: Negative for cold intolerance and heat intolerance.   Genitourinary: Negative for hematuria.   Musculoskeletal:        Right lower extremity swelling and pain   Skin: Positive for color change.   Neurological: Negative for dizziness, syncope, weakness and light-headedness.   Hematological: Does not bruise/bleed easily.   Psychiatric/Behavioral: Negative for depressed mood and stress. The patient is not nervous/anxious.        Past Medical History:   Past Medical History:   Diagnosis Date   • Alcohol abuse    • Alcoholism (CMS/HCC)    • Anxiety    • Arthritis     knees   • Asthma    • Cancer (CMS/HCC)     skin cancer   • Chipped tooth     several \"bad\" teeth   • Chronic pain disorder    • COPD (chronic obstructive pulmonary disease) (CMS/HCC)    • DVT (deep venous thrombosis) (CMS/HCC)     dvt in right leg x 2   • HIV disease (CMS/HCC)     was dx in 2011   • Hypertension    • Liver disease     elevated liver enzymes per pt   • Lower back pain    • Lumbar herniated disc    • Withdrawal symptoms, alcohol (CMS/HCC)        Past Surgical History:   Past Surgical History:   Procedure Laterality Date   • CARPAL TUNNEL RELEASE Right    • CARPAL TUNNEL RELEASE Left 3/1/2018    Procedure: CARPAL TUNNEL RELEASE LEFT;  Surgeon: Brendan Clarke MD;  Location: [facility];  Service:    • KNEE ARTHROSCOPY Right 8/21/2020    Procedure: KNEE ARTHROSCOPY RIGHT WITH PARTIAL  MEDIAL MENISCECTOMY ;  Surgeon: Dank Bethea MD;  Location: [facility];  Service: Orthopedics;  Laterality: Right;   • SKIN CANCER EXCISION         Family History:   Family History   Problem Relation Age of Onset   • Dementia Mother    • Alcohol abuse Father    • " Dementia Father    • Alcohol abuse Maternal Grandfather    • Alcohol abuse Maternal Grandmother        Social History:   Social History     Socioeconomic History   • Marital status:      Spouse name: Not on file   • Number of children: Not on file   • Years of education: Not on file   • Highest education level: Not on file   Tobacco Use   • Smoking status: Never Smoker   • Smokeless tobacco: Never Used   • Tobacco comment: was around 2nd hand smoke most of his life    Substance and Sexual Activity   • Alcohol use: Yes     Alcohol/week: 12.0 standard drinks     Types: 12 Cans of beer per week     Comment: 16 OZ BEERS PER DAY/ 12 PACK/6 pck 4-5 xs per week   • Drug use: Not Currently     Types: Marijuana   • Sexual activity: Defer       Medications:     Current Facility-Administered Medications:   •  albuterol (PROVENTIL) nebulizer solution 0.083% 2.5 mg/3mL, 2.5 mg, Nebulization, Q6H PRN, Nati, Arsenio K, DO  •  budesonide (PULMICORT) nebulizer solution 0.5 mg, 0.5 mg, Nebulization, BID - RT, [place], Arsenio K, DO, 0.5 mg at 11/17/20 2242  •  carvedilol (COREG) tablet 25 mg, 25 mg, Oral, BID With Meals, [place], Arsenio K, DO, 25 mg at 11/18/20 0839  •  cetirizine (zyrTEC) tablet 10 mg, 10 mg, Oral, Daily, [place], Arsenio K, DO, 10 mg at 11/18/20 0839  •  dolutegravir (TIVICAY) tablet 50 mg, 50 mg, Oral, Q24H, Nati, Arsenio K, DO, 50 mg at 11/18/20 0953  •  Emtricitabine-Tenofovir AF (DESCOVY) 200-25 MG per tablet 1 tablet, 1 tablet, Oral, Daily, Nati Arsenio K, DO, 1 tablet at 11/18/20 0953  •  [START ON 11/19/2020] thiamine (VITAMIN B-1) tablet 100 mg, 100 mg, Oral, Daily **AND** [START ON 11/19/2020] multivitamin with minerals 1 tablet, 1 tablet, Oral, Daily **AND** [START ON 11/19/2020] folic acid (FOLVITE) tablet 1 mg, 1 mg, Oral, Daily, Casey, Desean, MD  •  furosemide (LASIX) injection 20 mg, 20 mg, Intravenous, Q12H, Arsenio Damian, DO, 20 mg at 11/18/20 1034  •  heparin 61160 units/250 ml (100  units/ml) in D5W, 1,500 Units/hr, Intravenous, Titrated, Alice Villanueva PA-C, Last Rate: 12 mL/hr at 11/18/20 0336, 1,200 Units/hr at 11/18/20 0336  •  LORazepam (ATIVAN) tablet 1 mg, 1 mg, Oral, Q2H PRN **OR** LORazepam (ATIVAN) injection 1 mg, 1 mg, Intravenous, Q2H PRN **OR** LORazepam (ATIVAN) tablet 2 mg, 2 mg, Oral, Q1H PRN **OR** LORazepam (ATIVAN) injection 2 mg, 2 mg, Intravenous, Q1H PRN **OR** LORazepam (ATIVAN) injection 2 mg, 2 mg, Intravenous, Q15 Min PRN **OR** LORazepam (ATIVAN) injection 2 mg, 2 mg, Intramuscular, Q15 Min PRN, Desean Peña MD  •  LORazepam (ATIVAN) tablet 1 mg, 1 mg, Oral, Q6H, 1 mg at 11/18/20 1034 **FOLLOWED BY** [START ON 11/19/2020] LORazepam (ATIVAN) tablet 1 mg, 1 mg, Oral, Q8H, Desean Peña MD  •  ondansetron (ZOFRAN) tablet 4 mg, 4 mg, Oral, Q6H PRN **OR** ondansetron (ZOFRAN) injection 4 mg, 4 mg, Intravenous, Q6H PRN, Nati, Arsenio K, DO  •  potassium chloride (MICRO-K) CR capsule 10 mEq, 10 mEq, Oral, Daily, [place], Arsenio K, DO, 10 mEq at 11/18/20 0840  •  [COMPLETED] Insert peripheral IV, , , Once **AND** sodium chloride 0.9 % flush 10 mL, 10 mL, Intravenous, PRN, Alice Villanueva PA-C  •  sodium chloride 0.9 % flush 10 mL, 10 mL, Intravenous, Q12H, [place], Arsenio K, DO, 10 mL at 11/17/20 2219  •  sodium chloride 0.9 % flush 10 mL, 10 mL, Intravenous, PRN, Nati, Arsenio K, DO  •  sodium chloride 0.9 % infusion, 30 mL/hr, Intravenous, Continuous, Charles Damianin K, DO, Last Rate: 30 mL/hr at 11/17/20 2219, 30 mL/hr at 11/17/20 2219  •  traMADol (ULTRAM) tablet 50 mg, 50 mg, Oral, Q6H PRN, Charles Damianin K, DO  •  vitamin B-12 (CYANOCOBALAMIN) tablet 1,000 mcg, 1,000 mcg, Oral, Daily, Arsenio Damian K, DO, 1,000 mcg at 11/18/20 0839    Allergies:   Allergies   Allergen Reactions   • Hydralazine Cough     SORE THROAT       Physical Exam:  Vital Signs:   Vitals:    11/17/20 2247 11/17/20 2305 11/18/20 0300 11/18/20 0733   BP:  113/79 141/93 160/98   BP Location:   Left arm Left arm Left arm   Patient Position:  Lying Lying Lying   Pulse: 56 62 60 71   Resp: 18 20 18 18   Temp:  98.6 °F (37 °C) 98.8 °F (37.1 °C) 98.1 °F (36.7 °C)   TempSrc:  Oral Oral Oral   SpO2:  98% 95% 91%   Weight:       Height:           Physical Exam  Constitutional:       General: He is not in acute distress.     Appearance: Normal appearance. He is well-developed. He is not diaphoretic.   HENT:      Head: Normocephalic and atraumatic.   Eyes:      General: No scleral icterus.     Pupils: Pupils are equal, round, and reactive to light.   Neck:      Musculoskeletal: No muscular tenderness.      Trachea: No tracheal deviation.   Cardiovascular:      Rate and Rhythm: Normal rate and regular rhythm.      Heart sounds: Normal heart sounds. No murmur. No friction rub. No gallop.       Comments: Normal JVD.  Pulmonary:      Effort: Pulmonary effort is normal. No respiratory distress.      Breath sounds: Normal breath sounds. No stridor. No wheezing or rales.   Chest:      Chest wall: No tenderness.   Abdominal:      General: Bowel sounds are normal. There is no distension.      Palpations: Abdomen is soft.      Tenderness: There is no abdominal tenderness. There is no guarding or rebound.   Musculoskeletal: Normal range of motion.      Comments: 2+ right lower extremity edema   Lymphadenopathy:      Cervical: No cervical adenopathy.   Skin:     General: Skin is warm and dry.      Comments: Right lower extremity erythema   Neurological:      General: No focal deficit present.      Mental Status: He is alert and oriented to person, place, and time.   Psychiatric:         Mood and Affect: Mood normal.         Behavior: Behavior normal.         Results Review:   Results from last 7 days   Lab Units 11/18/20  0656   SODIUM mmol/L 141   POTASSIUM mmol/L 3.9   CHLORIDE mmol/L 103   CO2 mmol/L 28.5   BUN mg/dL 7   CREATININE mg/dL 1.20   CALCIUM mg/dL 9.2   BILIRUBIN mg/dL 0.4   ALK PHOS U/L 92   ALT (SGPT) U/L 27    AST (SGOT) U/L 37   GLUCOSE mg/dL 105*     Results from last 7 days   Lab Units 11/17/20  1744   TROPONIN T ng/mL <0.010     Results from last 7 days   Lab Units 11/18/20  0656 11/17/20  1744   WBC 10*3/mm3 3.92 5.00   HEMOGLOBIN g/dL 14.2 13.3   HEMATOCRIT % 41.4 38.9   PLATELETS 10*3/mm3 201 205     Results from last 7 days   Lab Units 11/18/20  0656 11/18/20  0110 11/17/20  1857 11/17/20  1744   INR  1.90*  --  2.25* 2.09*   APTT seconds 72.0 >200.0*  --  40.8*               I personally viewed and interpreted the patient's EKG/Telemetry data     Assessment / Plan:     1.  Right common femoral DVT  --Known history of recurrent right lower extremity DVTs while on Coumadin therapy  --Currently reports a 2 to 3-month history of persistent right lower extremity swelling, likely secondary to DVT  --Given chronicity of a DVT, unlikely to respond favorably to EKOS or mechanical thrombectomy  --Also, given symptoms are currently mild will defer invasive management at this time  --Recommend discontinuation of Coumadin and initiation of Eliquis  --Follow-up in the cardiology clinic in 2-3 weeks to reevaluate symptoms          Thank you for allowing me to participate in the care of your patient. Please do not hesitate to contact me with additional questions or concerns.     RYAN Miner MD  Interventional Cardiology   11/18/20  10:38 EST

## 2020-11-18 NOTE — ED NOTES
House supervisor called and advised the patient would go to room 305.     Tejas Bojorquez  11/17/20 2006

## 2020-11-18 NOTE — PROGRESS NOTES
Discharge Planning Assessment  [facility]     Patient Name: Kitty Parker  MRN: [id number]  Today's Date: 11/18/2020    Admit Date: 11/17/2020    Discharge Needs Assessment     Row Name 11/18/20 1112       Living Environment    Lives With  alone    Current Living Arrangements  home/apartment/condo    Primary Care Provided by  self    Provides Primary Care For  no one    Quality of Family Relationships  unable to assess    Able to Return to Prior Arrangements  yes       Resource/Environmental Concerns    Resource/Environmental Concerns  none    Transportation Concerns  car, none;rides, unreliable from others patient has a drivers license, no car.       Transition Planning    Patient/Family Anticipates Transition to  home    Transportation Anticipated  family or friend will provide       Discharge Needs Assessment    Readmission Within the Last 30 Days  no previous admission in last 30 days    Equipment Currently Used at Home  none    Equipment Needed After Discharge  none    Current Discharge Risk  substance use/abuse        Discharge Plan     Row Name 11/18/20 1112       Plan    Plan Met with Patient in room. All demographic information verified and updated family contact information. He is alert and oriented, no POA, or living will/advanced directive.  He's been admitted with DVT despite being on coumadin. He denied having financial trouble obtaining meds and other necessities. Signed up for Meds to beds to help with medication assistance upon discharge. He lives by himself in a mobile home at the [facility]. His mother lives next door. He doesn't have a car, and is unsure as to whom will be able to pick him up from the hospital when he's discharged. He said he might need a taxi or other transport. He is constantly moving, speaking quickly, avoiding eye contact, and was mildly diaphoretic during our initial meeting. He verbalized he's very anxious and needs a drink.  Says he has no  equipment or health services currently, and does not foresee needing anything at discharge.      Provided Post Acute Provider List?  N/A    Provided Post Acute Provider Quality & Resource List?  N/A    Patient/Family in Agreement with Plan  yes        Continued Care and Services - Admitted Since 11/17/2020    Coordination has not been started for this encounter.         Demographic Summary     Row Name 11/18/20 1051       General Information    Admission Type  inpatient    Arrived From  emergency department    Referral Source  admission list    Reason for Consult  discharge planning;substance use concerns    Preferred Language  English        Functional Status     Row Name 11/18/20 1052       Functional Status    Usual Activity Tolerance  good    Current Activity Tolerance  good       Functional Status, IADL    Medications  independent    Meal Preparation  independent    Housekeeping  independent    Laundry  independent    Shopping  independent       Mental Status    General Appearance WDL  ex;appearance    General Appearance  unkempt;unclean       Mental Status Summary    Recent Changes in Mental Status/Cognitive Functioning  no changes       Employment/    Employment Status  unemployed        Psychosocial     Row Name 11/18/20 1054       Behavior WDL    Interactions  eye contact darting;suspicious       Emotion Mood WDL    Emotion/Mood/Affect WDL  ex;affect;emotion mood    Affect  labile    Emotion/Mood  anxious;tense       Speech WDL    Speech WDL  WDL       Coping/Stress    Sources of Support  parent(s);adult child(mirta)    Understanding of Condition and Treatment  adequate understanding of medical condition       Developmental Stage (Eriksson's)    Developmental Stage  Stage 7 (35-65 years/Middle Adulthood) Generativity vs. Stagnation       C-SSRS (Recent)    Q1 Wished to be Dead (Past Month)  no    Q2 Suicidal Thoughts (Past Month)  no    Q6 Suicide Behavior (Lifetime)  no       Violence Risk    Feels  Like Hurting Others  no    Previous Attempt to Harm Others  no        Abuse/Neglect     Row Name 11/18/20 1059       Personal Safety    Feels Unsafe at Home or Work/School  no    Feels Threatened by Someone  no    Does Anyone Try to Keep You From Having Contact with Others or Doing Things Outside Your Home?  no    Physical Signs of Abuse Present  no        Legal    No documentation.       Substance Abuse     Row Name 11/18/20 1100       Substance Use    Substance Use Status  current street drug/inhalant/medication abuse;current alcohol use;current caffeine use    Caffeine Type  coffee    Caffeine Amount/Frequency  5-6 cups/cans per day    Environment Typically Uses Alcohol  alone    Environment Typically Uses Street Drugs  alone       AUDIT-C (Alcohol Use Disorders ID Test)    Alcohol Use In Past Year  4-->four or more times a week    Alcohol Amount Per Day In Past Year  4-->ten or more    More Than 6 Drinks On One Occasion  4-->daily or almost daily    Total AUDIT-C Score  12       Family Member Substance Use (#4)    Reported Type of Substances  alcohol;street drug/medication/inhalant;tobacco    Alcohol Type  beer    Reported Level of Alcohol Use  addiction/dependency    Street Drug/Medication/Inhalant Type  marijuana    Reported Level of Street Drug/Medication/Inhalant Use  abuse        Patient Forms    No documentation.           Rosenda Howard RN

## 2020-11-18 NOTE — PLAN OF CARE
Goal Outcome Evaluation:  Plan of Care Reviewed With: patient  Progress: no change   Tolerating PO meds. Heparin drip was halted temporarily and restarted after one hour with appropriate adjustments. Resting peacefully between care. Sating well on 2L NC. Will continue to monitor.

## 2020-11-18 NOTE — PLAN OF CARE
Goal Outcome Evaluation:  Patient appropriate for DC per MD, will follow up with cardiology outpatient

## 2020-11-18 NOTE — ED NOTES
Called house supervisor for bed assignment, advised she would call back with assignment.     Tejas Bojorquez  11/17/20 2003

## 2020-11-18 NOTE — PAYOR COMM NOTE
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   1914 Potassium: 3.7 [LA]   1914 Chloride(!): 96 [LA]   1914 CO2: 22.8 [LA]   1914 Calcium: 9.1 [LA]   1914 Total Protein: 7.2 [LA]   1914 Albumin: 3.60 [LA]   1914 ALT (SGPT): 28 [LA]   1914 AST (SGOT): 34 [LA]   1914 Alkaline Phosphatase: 90 [LA]   1914 Total Bilirubin: 0.3 [LA]   1914 eGFR Non  Am: 66 [LA]   1914 Globulin: 3.6 [LA]   1914 A/G Ratio: 1.0 [LA]   1914 Anion Gap: 13.2 [LA]   1914 Troponin T: <0.010 [LA]   1914 PTT(!): 40.8 [LA]   1915 FINDINGS:  There is incomplete compressibility in the right common femoral  vein secondary to nonocclusive thrombus.  Otherwise, normal  flow, compressibility and augmentation is seen in the remaining  veins.     IMPRESSION:  Noncompressive thrombus of the right common femoral vein.     Authenticated by Casey Jewell M.D. on 11/17/2020 06:36:39 PM    [LA]   1915 EKG interpreted by me reveals sinus rhythm with rate of 54 bpm.  Some low voltage but no obvious acute ST segment or T wave changes only some nonspecific findings.  This is an atypical appearing EKG.    [TB]   1936 INR(!): 2.25 [LA]   1936 Protime(!): 26.3 [LA]   1951 CT of the chest as read by the radiologist was unremarkable.    [LA]   1955 Spoke with Dr. Damian who graciously accepted the  patient for admission.    [LA]      ED Course User Index  [LA] Alice Villanueva PA-C  [TB] Savannah Gallegos MD                                           MDM  Number of Diagnoses or Management Options  Alcohol use:   Deep vein thrombosis (DVT) of femoral vein of right lower extremity, unspecified chronicity (CMS/HCC):   Pain of right lower extremity:   Diagnosis management comments: On arrival, patient is stable, no acute distress, nontoxic appearance.  Have low concern for an arterial occlusion given patient has strong palpable distal pulses. differential could include DVT, venous insufficiency, cellulitis, and other concerns.  I have lower concern for any kind of septic joint given patient has full range of motion of the knee. Will obtain basic labs, INR, x-ray of the knee, venous duplex of the right lower extremity as well.  Did discuss with Dr. Richards as well.    CBC was stable without any significant leukocytosis, initial INR was 2.09.  X-rays reviewed with the attending, did not see any significant bony abnormality.  Ultrasound revealed a nonocclusive noncompressible thrombus in the right common femoral vein.  Believe the erythema and edema is all from this clot, I have low concern for any kind of infection.  The nurse did inform me that patient dropped to 88% on room air while he was asleep and they placed him on nasal cannula.  Given his DVT, will order CTA of the chest as well.  Dr. Richards did contact Dr. Miner who will be on-call for vascular surgery in the morning and he advised that we heparinized the patient, hold Coumadin and he can evaluate in the morning. Spoke with Dr. Damian who graciously accepted the patient for admission.        Amount and/or Complexity of Data Reviewed  Clinical lab tests: reviewed and ordered  Tests in the radiology section of CPT®: reviewed and ordered  Discussion of test results with the performing providers: yes  Review and summarize past medical records: yes  Discuss  the patient with other providers: yes    Risk of Complications, Morbidity, and/or Mortality  Presenting problems: moderate  Diagnostic procedures: moderate  Management options: moderate    Patient Progress  Patient progress: stable      Final diagnoses:   Deep vein thrombosis (DVT) of femoral vein of right lower extremity, unspecified chronicity (CMS/HCC)   Pain of right lower extremity   Alcohol use            Alice Villanueva PA-C  11/17/20 2117      Electronically signed by Alice Villanueva PA-C at 11/17/20 2117     Tejas Bojorquez at 11/17/20 1952        Called Dr. Damian for Alice VIDES call transferred.     Tejas Bojorquez  11/17/20 1953      Electronically signed by Tejas Bojorquez at 11/17/20 1953     Tejas Bojorquez at 11/17/20 2003        Called house supervisor for bed assignment, advised she would call back with assignment.     Tejas Bojorquez  11/17/20 2003      Electronically signed by eTjas Bojorquez at 11/17/20 2003     Tejas Bojorquez at 11/17/20 2006        House supervisor called and advised the patient would go to room 305.     Tejas Bojorquez  11/17/20 2006      Electronically signed by Tejas Bojorquez at 11/17/20 2006       Vital Signs (last day)     Date/Time   Temp   Temp src   Pulse   Resp   BP   Patient Position   SpO2    11/18/20 0733   98.1 (36.7)   Oral   71   18   160/98   Lying   91    11/18/20 0300   98.8 (37.1)   Oral   60   18   141/93   Lying   95    11/17/20 2305   98.6 (37)   Oral   62   20   113/79   Lying   98    11/17/20 2247   --   --   56   18   --   --   --    11/17/20 2242   --   --   55   18   --   --   95    11/17/20 2055   98.1 (36.7)   Oral   59   20   123/85   Lying   92    11/17/20 2000   --   --   63   --   114/77   --   97    11/17/20 1930   --   --   59   --   121/79   --   98    11/17/20 1900   --   --   59   --   109/50   --   --    11/17/20 1859   --   --   59   --   --   --   98    11/17/20 1830   --   --   --   --   103/67   --   --    11/17/20 1800   --   --    --   --   --   --   93    11/17/20 17:45:31   --   --   54   20   --   --   93    11/17/20 1642   98.1 (36.7)   Oral   66   16   130/91   Sitting   95                Current Facility-Administered Medications   Medication Dose Route Frequency Provider Last Rate Last Admin   • albuterol (PROVENTIL) nebulizer solution 0.083% 2.5 mg/3mL  2.5 mg Nebulization Q6H PRN Arsenio Damian K, DO       • budesonide (PULMICORT) nebulizer solution 0.5 mg  0.5 mg Nebulization BID - RT Arsenio Damian K, DO   0.5 mg at 11/17/20 2242   • carvedilol (COREG) tablet 25 mg  25 mg Oral BID With Meals Arsenio Damian K, DO   25 mg at 11/18/20 0839   • cetirizine (zyrTEC) tablet 10 mg  10 mg Oral Daily Arsenio Damian, DO   10 mg at 11/18/20 0839   • dolutegravir (TIVICAY) tablet 50 mg  50 mg Oral Q24H Arsenio Damian, DO   50 mg at 11/18/20 0953   • Emtricitabine-Tenofovir AF (DESCOVY) 200-25 MG per tablet 1 tablet  1 tablet Oral Daily Arsenio Damian DO   1 tablet at 11/18/20 0953   • [START ON 11/19/2020] thiamine (VITAMIN B-1) tablet 100 mg  100 mg Oral Daily Desean Peña MD        And   • [START ON 11/19/2020] multivitamin with minerals 1 tablet  1 tablet Oral Daily Desean Peña MD        And   • [START ON 11/19/2020] folic acid (FOLVITE) tablet 1 mg  1 mg Oral Daily Desean Peña MD       • furosemide (LASIX) injection 20 mg  20 mg Intravenous Q12H Arsenio Damian, DO   20 mg at 11/17/20 2219   • heparin 65474 units/250 ml (100 units/ml) in D5W  1,500 Units/hr Intravenous Titrated Alice Villanueva PA-C 12 mL/hr at 11/18/20 0336 1,200 Units/hr at 11/18/20 0336   • LORazepam (ATIVAN) tablet 1 mg  1 mg Oral Q2H PRN Desean Peña MD        Or   • LORazepam (ATIVAN) injection 1 mg  1 mg Intravenous Q2H PRN Desean Peña MD        Or   • LORazepam (ATIVAN) tablet 2 mg  2 mg Oral Q1H PRN Desean Peña MD        Or   • LORazepam (ATIVAN) injection 2 mg  2 mg Intravenous Q1H PRN Desean Peña MD        Or   • LORazepam (ATIVAN) injection  2 mg  2 mg Intravenous Q15 Min PRN Desean Peña MD        Or   • LORazepam (ATIVAN) injection 2 mg  2 mg Intramuscular Q15 Min PRN Desean Peña MD       • LORazepam (ATIVAN) tablet 1 mg  1 mg Oral Q6H Desean Peña MD        Followed by   • [START ON 11/19/2020] LORazepam (ATIVAN) tablet 1 mg  1 mg Oral Q8H Desean Peña MD       • multivitamin with minerals 1 tablet  1 tablet Oral Nightly Nati, Arsenio K, DO   1 tablet at 11/17/20 2219   • ondansetron (ZOFRAN) tablet 4 mg  4 mg Oral Q6H PRN Nati, Arsenio K, DO        Or   • ondansetron (ZOFRAN) injection 4 mg  4 mg Intravenous Q6H PRN Nati, Arsenio K, DO       • potassium chloride (MICRO-K) CR capsule 10 mEq  10 mEq Oral Daily Nati, Arsenio K, DO   10 mEq at 11/18/20 0840   • sodium chloride 0.9 % flush 10 mL  10 mL Intravenous PRN Alice Villanueva PA-C       • sodium chloride 0.9 % flush 10 mL  10 mL Intravenous Q12H Nati, Arsenio K, DO   10 mL at 11/17/20 2219   • sodium chloride 0.9 % flush 10 mL  10 mL Intravenous PRN [place], Arsenio K, DO       • sodium chloride 0.9 % infusion  30 mL/hr Intravenous Continuous [place], Arsenio K, DO 30 mL/hr at 11/17/20 2219 30 mL/hr at 11/17/20 2219   • traMADol (ULTRAM) tablet 50 mg  50 mg Oral Q6H PRN [place], Arsenio K, DO       • vitamin B-12 (CYANOCOBALAMIN) tablet 1,000 mcg  1,000 mcg Oral Daily [place], Arsenio K, DO   1,000 mcg at 11/18/20 0839       Lab Results (last 24 hours)     Procedure Component Value Units Date/Time    aPTT [[id number]]  (Normal) Collected: 11/18/20 0656    Specimen: Blood Updated: 11/18/20 0742     PTT 72.0 seconds     Comprehensive Metabolic Panel [[id number]]  (Abnormal) Collected: 11/18/20 0656    Specimen: Blood Updated: 11/18/20 0739     Glucose 105 mg/dL      BUN 7 mg/dL      Creatinine 1.20 mg/dL      Sodium 141 mmol/L      Potassium 3.9 mmol/L      Chloride 103 mmol/L      CO2 28.5 mmol/L      Calcium 9.2 mg/dL      Total Protein 7.5 g/dL      Albumin 3.80 g/dL      ALT (SGPT) 27 U/L       AST (SGOT) 37 U/L      Alkaline Phosphatase 92 U/L      Total Bilirubin 0.4 mg/dL      eGFR Non African Amer 63 mL/min/1.73      Globulin 3.7 gm/dL      A/G Ratio 1.0 g/dL      BUN/Creatinine Ratio 5.8     Anion Gap 9.5 mmol/L     Narrative:      GFR Normal >60  Chronic Kidney Disease <60  Kidney Failure <15      Protime-INR [[id number]]  (Abnormal) Collected: 11/18/20 0656    Specimen: Blood Updated: 11/18/20 0736     Protime 22.9 Seconds      INR 1.90    Narrative:      Suggested INR therapeutic range for stable oral anticoagulant therapy:    Low Intensity therapy:   1.5-2.0  Moderate Intensity therapy:   2.0-3.0  High Intensity therapy:   2.5-4.0    CBC Auto Differential [[id number]]  (Abnormal) Collected: 11/18/20 0656    Specimen: Blood Updated: 11/18/20 0718     WBC 3.92 10*3/mm3      RBC 4.18 10*6/mm3      Hemoglobin 14.2 g/dL      Hematocrit 41.4 %      MCV 99.0 fL      MCH 34.0 pg      MCHC 34.3 g/dL      RDW 12.6 %      RDW-SD 46.6 fl      MPV 9.4 fL      Platelets 201 10*3/mm3      Neutrophil % 53.5 %      Lymphocyte % 26.3 %      Monocyte % 12.5 %      Eosinophil % 6.1 %      Basophil % 1.3 %      Immature Grans % 0.3 %      Neutrophils, Absolute 2.10 10*3/mm3      Lymphocytes, Absolute 1.03 10*3/mm3      Monocytes, Absolute 0.49 10*3/mm3      Eosinophils, Absolute 0.24 10*3/mm3      Basophils, Absolute 0.05 10*3/mm3      Immature Grans, Absolute 0.01 10*3/mm3      nRBC 0.0 /100 WBC     aPTT [[id number]]  (Abnormal) Collected: 11/18/20 0110    Specimen: Blood Updated: 11/18/20 0342     PTT >200.0 seconds     Protime-INR [[id number]]  (Abnormal) Collected: 11/17/20 1857    Specimen: Blood Updated: 11/17/20 1934     Protime 26.3 Seconds      INR 2.25    Narrative:      Suggested INR therapeutic range for stable oral anticoagulant therapy:    Low Intensity therapy:   1.5-2.0  Moderate Intensity therapy:   2.0-3.0  High Intensity therapy:   2.5-4.0    Comprehensive Metabolic Panel [[id number]]  (Abnormal)  Collected: 11/17/20 1744    Specimen: Blood Updated: 11/17/20 1859     Glucose 75 mg/dL      BUN 6 mg/dL      Creatinine 1.14 mg/dL      Sodium 132 mmol/L      Potassium 3.7 mmol/L      Chloride 96 mmol/L      CO2 22.8 mmol/L      Calcium 9.1 mg/dL      Total Protein 7.2 g/dL      Albumin 3.60 g/dL      ALT (SGPT) 28 U/L      AST (SGOT) 34 U/L      Alkaline Phosphatase 90 U/L      Total Bilirubin 0.3 mg/dL      eGFR Non African Amer 66 mL/min/1.73      Globulin 3.6 gm/dL      A/G Ratio 1.0 g/dL      BUN/Creatinine Ratio 5.3     Anion Gap 13.2 mmol/L     Narrative:      GFR Normal >60  Chronic Kidney Disease <60  Kidney Failure <15      Troponin [[id number]]  (Normal) Collected: 11/17/20 1744    Specimen: Blood Updated: 11/17/20 1859     Troponin T <0.010 ng/mL     Narrative:      Troponin T Reference Range:  <= 0.03 ng/mL-   Negative for AMI  >0.03 ng/mL-     Abnormal for myocardial necrosis.  Clinicians would have to utilize clinical acumen, EKG, Troponin and serial changes to determine if it is an Acute Myocardial Infarction or myocardial injury due to an underlying chronic condition.       Results may be falsely decreased if patient taking Biotin.      aPTT [[id number]]  (Abnormal) Collected: 11/17/20 1744    Specimen: Blood Updated: 11/17/20 1853     PTT 40.8 seconds     Protime-INR [[id number]]  (Abnormal) Collected: 11/17/20 1744    Specimen: Blood Updated: 11/17/20 1807     Protime 24.8 Seconds      INR 2.09    Narrative:      Suggested INR therapeutic range for stable oral anticoagulant therapy:    Low Intensity therapy:   1.5-2.0  Moderate Intensity therapy:   2.0-3.0  High Intensity therapy:   2.5-4.0    CBC & Differential [[id number]]  (Abnormal) Collected: 11/17/20 1744    Specimen: Blood Updated: 11/17/20 1753    Narrative:      The following orders were created for panel order CBC & Differential.  Procedure                               Abnormality         Status                     ---------                                -----------         ------                     CBC Auto Differential[[id number]]        Abnormal            Final result                 Please view results for these tests on the individual orders.    CBC Auto Differential [[id number]]  (Abnormal) Collected: 11/17/20 1744    Specimen: Blood Updated: 11/17/20 1753     WBC 5.00 10*3/mm3      RBC 3.91 10*6/mm3      Hemoglobin 13.3 g/dL      Hematocrit 38.9 %      MCV 99.5 fL      MCH 34.0 pg      MCHC 34.2 g/dL      RDW 12.6 %      RDW-SD 45.8 fl      MPV 9.3 fL      Platelets 205 10*3/mm3      Neutrophil % 49.6 %      Lymphocyte % 30.0 %      Monocyte % 10.8 %      Eosinophil % 8.2 %      Basophil % 1.2 %      Immature Grans % 0.2 %      Neutrophils, Absolute 2.48 10*3/mm3      Lymphocytes, Absolute 1.50 10*3/mm3      Monocytes, Absolute 0.54 10*3/mm3      Eosinophils, Absolute 0.41 10*3/mm3      Basophils, Absolute 0.06 10*3/mm3      Immature Grans, Absolute 0.01 10*3/mm3      nRBC 0.0 /100 WBC         Imaging Results (Last 24 Hours)     Procedure Component Value Units Date/Time    CT Chest Pulmonary Embolism [[id number]] Collected: 11/17/20 1950     Updated: 11/17/20 1951    Narrative:      FINAL REPORT    TECHNIQUE:  Thin section axial images were obtained through the chest and  during the arterial phase of IV contrast administration. Coronal  3-D MIP reconstructed images were also provided.    CLINICAL HISTORY:  Hypoxia, DVT right leg  pe protocol    FINDINGS:  The pulmonary arteries are well-opacified and there is no  filling defect to indicate pulmonary embolism. The thoracic  aorta is normal. The lungs are clear and there is no pleural  disease, adenopathy or significant osseous abnormality.      Impression:      Unremarkable.    Authenticated by Casey Jewell M.D. on 11/17/2020 07:50:42 PM    US Venous Doppler Lower Extremity Right (duplex) [[id number]] Collected: 11/17/20 1836     Updated: 11/17/20 1837    Narrative:      FINAL  REPORT    TECHNIQUE:  Multiple transverse and longitudinal images were performed of  right the femoral-popliteal deep venous system with augmentation  and compression maneuvers.    CLINICAL HISTORY:  RECENT RLE DVT. PAIN AND SWELLING.    FINDINGS:  There is incomplete compressibility in the right common femoral  vein secondary to nonocclusive thrombus.  Otherwise, normal  flow, compressibility and augmentation is seen in the remaining  veins.      Impression:      Noncompressive thrombus of the right common femoral vein.    Authenticated by Casey Jewell M.D. on 11/17/2020 06:36:39 PM    XR Knee 3 View Right [[id number]] Resulted: 11/17/20 1716     Updated: 11/17/20 1717        Physician Progress Notes (last 24 hours) (Notes from 11/17/20 1019 through 11/18/20 1019)    No notes of this type exist for this encounter.         Consult Notes (last 24 hours) (Notes from 11/17/20 1019 through 11/18/20 1019)    No notes of this type exist for this encounter.
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
## 2020-12-17 DIAGNOSIS — R00.2 PALPITATIONS: Primary | ICD-10-CM

## 2020-12-17 DIAGNOSIS — I48.91 ATRIAL FIBRILLATION, UNSPECIFIED TYPE (HCC): ICD-10-CM

## 2020-12-17 NOTE — TELEPHONE ENCOUNTER
[employer] insurance  Ph: [phone], fax: [phone]  BIN: [id number],PCN: [id number], Rx Group: [id number] ID: [id number]

## (undated) DEVICE — DISPOSABLE TOURNIQUET CUFF SINGLE BLADDER, SINGLE PORT AND QUICK CONNECT CONNECTOR: Brand: COLOR CUFF

## (undated) DEVICE — GLV SURG SENSICARE W/ALOE PF LF 8 STRL

## (undated) DEVICE — PK EXTRM UPPR 20

## (undated) DEVICE — PAD CAST SOF ROL NS 4IN

## (undated) DEVICE — BNDG ELAS CO-FLEX SLF ADHR 4IN5YD LF STRL

## (undated) DEVICE — GLV SURG SENSICARE PI LF PF 8 GRN STRL

## (undated) DEVICE — GLV SURG SENSICARE W/ALOE PF LF 7.5 STRL

## (undated) DEVICE — BNDG ESMARK 6INX9FT STRL

## (undated) DEVICE — BNDG ELAS MATRX V/CLS 4IN 5YD LF

## (undated) DEVICE — STERILE CAST PADDING KIT: Brand: CARDINAL HEALTH

## (undated) DEVICE — SUT ETHLN 4/0 PS2 PLSTC 1667G

## (undated) DEVICE — DYONICS 25 PATIENT TUBE SET MUST                                    BE USED WITH 7211007, 12 PER BOX

## (undated) DEVICE — PTFE CTD NEEDLE EXT INS 2.75': Brand: MEDLINE

## (undated) DEVICE — BNDG ELAS MATRX V/CLS 6IN 5YD LF

## (undated) DEVICE — EMERALD ARTHROSCOPY SHEET: Brand: CONVERTORS

## (undated) DEVICE — CUFF SCD HEMOFORCE SEQ CALF STD MD

## (undated) DEVICE — SPNG GZ WOVN 4X4IN 12PLY 10/BX STRL

## (undated) DEVICE — GLV SURG BIOGEL M LTX PF 8 1/2

## (undated) DEVICE — GOWN,PREVENTION PLUS,LARGE,STERILE: Brand: MEDLINE

## (undated) DEVICE — DRSNG GZ PETROLTM XEROFORM CURAD 1X8IN STRL

## (undated) DEVICE — CAST PADDING 6 IN KIT: Brand: CARDINAL HEALTH

## (undated) DEVICE — UNDERCAST PADDING: Brand: DEROYAL

## (undated) DEVICE — DRSNG WND GZ CURAD OIL EMULSION 3X3IN STRL

## (undated) DEVICE — PK KN ARTHSCP 20

## (undated) DEVICE — 4.5 MM FULL RADIUS STRAIGHT                                    BLADES, POWER/EP-1, YELLOW, PACKAGED                                    6 PER BOX, STERILE: Brand: DYONICS

## (undated) DEVICE — SUT MNCRYL PLS ANTIB UD 4/0 PS2 18IN

## (undated) DEVICE — DRSNG ADAPTIC 3X8